# Patient Record
Sex: FEMALE | Race: WHITE | NOT HISPANIC OR LATINO | Employment: FULL TIME | URBAN - METROPOLITAN AREA
[De-identification: names, ages, dates, MRNs, and addresses within clinical notes are randomized per-mention and may not be internally consistent; named-entity substitution may affect disease eponyms.]

---

## 2023-10-23 ENCOUNTER — OFFICE VISIT (OUTPATIENT)
Dept: URGENT CARE | Facility: CLINIC | Age: 43
End: 2023-10-23
Payer: COMMERCIAL

## 2023-10-23 VITALS
BODY MASS INDEX: 23.3 KG/M2 | WEIGHT: 145 LBS | TEMPERATURE: 98 F | RESPIRATION RATE: 18 BRPM | HEIGHT: 66 IN | DIASTOLIC BLOOD PRESSURE: 89 MMHG | SYSTOLIC BLOOD PRESSURE: 122 MMHG | OXYGEN SATURATION: 98 % | HEART RATE: 90 BPM

## 2023-10-23 DIAGNOSIS — M25.511 ACUTE PAIN OF RIGHT SHOULDER: Primary | ICD-10-CM

## 2023-10-23 DIAGNOSIS — R20.2 PARESTHESIA OF RIGHT UPPER EXTREMITY: ICD-10-CM

## 2023-10-23 DIAGNOSIS — M62.838 MUSCLE SPASM: ICD-10-CM

## 2023-10-23 PROCEDURE — 99203 PR OFFICE/OUTPT VISIT, NEW, LEVL III, 30-44 MIN: ICD-10-PCS | Mod: S$GLB,,, | Performed by: NURSE PRACTITIONER

## 2023-10-23 PROCEDURE — 99203 OFFICE O/P NEW LOW 30 MIN: CPT | Mod: S$GLB,,, | Performed by: NURSE PRACTITIONER

## 2023-10-23 RX ORDER — MULTIVITAMIN
1 TABLET ORAL EVERY MORNING
COMMUNITY

## 2023-10-23 RX ORDER — PREDNISONE 20 MG/1
20 TABLET ORAL 2 TIMES DAILY
Qty: 10 TABLET | Refills: 0 | Status: SHIPPED | OUTPATIENT
Start: 2023-10-23 | End: 2023-10-28

## 2023-10-23 RX ORDER — TIZANIDINE 2 MG/1
2 TABLET ORAL EVERY 6 HOURS PRN
Qty: 20 TABLET | Refills: 0 | Status: SHIPPED | OUTPATIENT
Start: 2023-10-23 | End: 2023-12-01 | Stop reason: SDUPTHER

## 2023-10-23 NOTE — PROGRESS NOTES
"Subjective:      Patient ID: Melinda Lobo is a 43 y.o. female.    Vitals:  height is 5' 6" (1.676 m) and weight is 65.8 kg (145 lb). Her oral temperature is 98 °F (36.7 °C). Her blood pressure is 122/89 and her pulse is 90. Her respiration is 18 and oxygen saturation is 98%.     Chief Complaint: Shoulder Pain    Patient presents to clinic with right shoulder pain after a fall, onset 2 weeks ago. Patient states that she is having a hard time lifting her arm up. Admits some weakness and pain. Pain rated 6/10, patient states she has felt tingling in her right arm and hand after laying on it wrong. Has been taking naproxen and ibuprofen X 2 weeks with little relief. Has not rested it much.     Shoulder Pain   The pain is present in the right shoulder. This is a new problem. The current episode started 1 to 4 weeks ago. The problem occurs constantly. The pain is at a severity of 6/10. Associated symptoms include tingling. The symptoms are aggravated by activity. Treatments tried: naproxin, advil. The treatment provided mild relief.       Skin:  Negative for erythema.      Objective:     Physical Exam   Constitutional: She is oriented to person, place, and time. She appears well-developed.   HENT:   Head: Normocephalic and atraumatic. Head is without abrasion, without contusion and without laceration.   Ears:   Right Ear: External ear normal.   Left Ear: External ear normal.   Nose: Nose normal.   Mouth/Throat: Oropharynx is clear and moist and mucous membranes are normal.   Eyes: Conjunctivae, EOM and lids are normal.   Neck: Trachea normal and phonation normal. Neck supple.   Cardiovascular: Normal rate, regular rhythm and normal heart sounds.   Pulmonary/Chest: Effort normal and breath sounds normal. No stridor. No respiratory distress. She has no wheezes.   Musculoskeletal: Normal range of motion.         General: Normal range of motion.      Right shoulder: She exhibits tenderness. Decreased range of " motion: pain with ROM.       Arms:    Neurological: She is alert and oriented to person, place, and time.   Skin: Skin is warm, dry, intact and no rash. Capillary refill takes less than 2 seconds. No abrasion, No burn, No bruising, No erythema and No ecchymosis   Psychiatric: Her speech is normal and behavior is normal. Judgment and thought content normal.   Nursing note and vitals reviewed.      Assessment:     1. Acute pain of right shoulder    2. Paresthesia of right upper extremity    3. Muscle spasm        Plan:       Acute pain of right shoulder  -     predniSONE (DELTASONE) 20 MG tablet; Take 1 tablet (20 mg total) by mouth 2 (two) times daily. for 5 days  Dispense: 10 tablet; Refill: 0  -     tiZANidine (ZANAFLEX) 2 MG tablet; Take 1 tablet (2 mg total) by mouth every 6 (six) hours as needed (muscle spasm).  Dispense: 20 tablet; Refill: 0    Paresthesia of right upper extremity  -     predniSONE (DELTASONE) 20 MG tablet; Take 1 tablet (20 mg total) by mouth 2 (two) times daily. for 5 days  Dispense: 10 tablet; Refill: 0    Muscle spasm  -     tiZANidine (ZANAFLEX) 2 MG tablet; Take 1 tablet (2 mg total) by mouth every 6 (six) hours as needed (muscle spasm).  Dispense: 20 tablet; Refill: 0                Rest your affected extremity as much as possible.  Take tylenol or ibuprofen as directed on label for pain. You may alternate the two every four hours.  Prednisone as prescribed.  Alternate ice and heat to affected site four times daily for 15-20 minutes each time.  May apply sling as needed for support of shoulder.   Take muscle relaxant as prescribed to help with muscles spasms. Avoiding driving while taking since it can cause drowsiness.   Follow up with your primary care provider if symptoms do not improve within a few days or sooner for any worsening.   Go to the ER immediately for any numbness, weakness, tingling, color change, sudden pain and swelling, or for any other new and concerning symptoms.

## 2023-10-23 NOTE — PATIENT INSTRUCTIONS
Rest your affected extremity as much as possible.  Take tylenol or ibuprofen as directed on label for pain. You may alternate the two every four hours.  Prednisone as prescribed.  Alternate ice and heat to affected site four times daily for 15-20 minutes each time.  May apply sling as needed for support of shoulder.   Take muscle relaxant as prescribed to help with muscles spasms. Avoiding driving while taking since it can cause drowsiness.   Follow up with your primary care provider if symptoms do not improve within a few days or sooner for any worsening.   Go to the ER immediately for any numbness, weakness, tingling, color change, sudden pain and swelling, or for any other new and concerning symptoms.

## 2023-11-13 ENCOUNTER — PATIENT MESSAGE (OUTPATIENT)
Dept: ORTHOPEDICS | Facility: CLINIC | Age: 43
End: 2023-11-13
Payer: COMMERCIAL

## 2023-11-13 DIAGNOSIS — M25.511 RIGHT SHOULDER PAIN, UNSPECIFIED CHRONICITY: Primary | ICD-10-CM

## 2023-11-15 ENCOUNTER — HOSPITAL ENCOUNTER (OUTPATIENT)
Dept: RADIOLOGY | Facility: HOSPITAL | Age: 43
Discharge: HOME | End: 2023-11-15
Attending: STUDENT IN AN ORGANIZED HEALTH CARE EDUCATION/TRAINING PROGRAM
Payer: COMMERCIAL

## 2023-11-15 ENCOUNTER — OFFICE VISIT (OUTPATIENT)
Dept: SPORTS MEDICINE | Facility: CLINIC | Age: 43
End: 2023-11-15
Payer: COMMERCIAL

## 2023-11-15 VITALS — BODY MASS INDEX: 23.31 KG/M2 | HEIGHT: 66 IN | WEIGHT: 145.06 LBS

## 2023-11-15 DIAGNOSIS — S42.251A CLOSED DISPLACED FRACTURE OF GREATER TUBEROSITY OF RIGHT HUMERUS, INITIAL ENCOUNTER: Primary | ICD-10-CM

## 2023-11-15 DIAGNOSIS — M25.511 RIGHT SHOULDER PAIN, UNSPECIFIED CHRONICITY: ICD-10-CM

## 2023-11-15 PROCEDURE — 1159F MED LIST DOCD IN RCRD: CPT | Mod: CPTII,S$GLB,, | Performed by: STUDENT IN AN ORGANIZED HEALTH CARE EDUCATION/TRAINING PROGRAM

## 2023-11-15 PROCEDURE — 3008F PR BODY MASS INDEX (BMI) DOCUMENTED: ICD-10-PCS | Mod: CPTII,S$GLB,, | Performed by: STUDENT IN AN ORGANIZED HEALTH CARE EDUCATION/TRAINING PROGRAM

## 2023-11-15 PROCEDURE — 3008F BODY MASS INDEX DOCD: CPT | Mod: CPTII,S$GLB,, | Performed by: STUDENT IN AN ORGANIZED HEALTH CARE EDUCATION/TRAINING PROGRAM

## 2023-11-15 PROCEDURE — 99999 PR PBB SHADOW E&M-EST. PATIENT-LVL III: ICD-10-PCS | Mod: PBBFAC,,, | Performed by: STUDENT IN AN ORGANIZED HEALTH CARE EDUCATION/TRAINING PROGRAM

## 2023-11-15 PROCEDURE — 99204 PR OFFICE/OUTPT VISIT, NEW, LEVL IV, 45-59 MIN: ICD-10-PCS | Mod: 57,S$GLB,, | Performed by: STUDENT IN AN ORGANIZED HEALTH CARE EDUCATION/TRAINING PROGRAM

## 2023-11-15 PROCEDURE — 99204 OFFICE O/P NEW MOD 45 MIN: CPT | Mod: 57,S$GLB,, | Performed by: STUDENT IN AN ORGANIZED HEALTH CARE EDUCATION/TRAINING PROGRAM

## 2023-11-15 PROCEDURE — 97760 PR ORTHOTIC MGMT&TRAINJ INITIAL ENC EA 15 MINS: ICD-10-PCS | Mod: GP,S$GLB,, | Performed by: STUDENT IN AN ORGANIZED HEALTH CARE EDUCATION/TRAINING PROGRAM

## 2023-11-15 PROCEDURE — 23620 PR CLOSED RX GR TUBEROSITY HUM FX: ICD-10-PCS | Mod: RT,S$GLB,, | Performed by: STUDENT IN AN ORGANIZED HEALTH CARE EDUCATION/TRAINING PROGRAM

## 2023-11-15 PROCEDURE — 1160F PR REVIEW ALL MEDS BY PRESCRIBER/CLIN PHARMACIST DOCUMENTED: ICD-10-PCS | Mod: CPTII,S$GLB,, | Performed by: STUDENT IN AN ORGANIZED HEALTH CARE EDUCATION/TRAINING PROGRAM

## 2023-11-15 PROCEDURE — 23620 CLTX GR HMRL TBRS FX WO MNPJ: CPT | Mod: RT,S$GLB,, | Performed by: STUDENT IN AN ORGANIZED HEALTH CARE EDUCATION/TRAINING PROGRAM

## 2023-11-15 PROCEDURE — 97760 ORTHOTIC MGMT&TRAING 1ST ENC: CPT | Mod: GP,S$GLB,, | Performed by: STUDENT IN AN ORGANIZED HEALTH CARE EDUCATION/TRAINING PROGRAM

## 2023-11-15 PROCEDURE — 73030 X-RAY EXAM OF SHOULDER: CPT | Mod: 26,RT,, | Performed by: RADIOLOGY

## 2023-11-15 PROCEDURE — 73030 XR SHOULDER COMPLETE 2 OR MORE VIEWS RIGHT: ICD-10-PCS | Mod: 26,RT,, | Performed by: RADIOLOGY

## 2023-11-15 PROCEDURE — 1159F PR MEDICATION LIST DOCUMENTED IN MEDICAL RECORD: ICD-10-PCS | Mod: CPTII,S$GLB,, | Performed by: STUDENT IN AN ORGANIZED HEALTH CARE EDUCATION/TRAINING PROGRAM

## 2023-11-15 PROCEDURE — 99999 PR PBB SHADOW E&M-EST. PATIENT-LVL III: CPT | Mod: PBBFAC,,, | Performed by: STUDENT IN AN ORGANIZED HEALTH CARE EDUCATION/TRAINING PROGRAM

## 2023-11-15 PROCEDURE — 73030 X-RAY EXAM OF SHOULDER: CPT | Mod: TC,RT

## 2023-11-15 PROCEDURE — 1160F RVW MEDS BY RX/DR IN RCRD: CPT | Mod: CPTII,S$GLB,, | Performed by: STUDENT IN AN ORGANIZED HEALTH CARE EDUCATION/TRAINING PROGRAM

## 2023-11-15 RX ORDER — TRAMADOL HYDROCHLORIDE 50 MG/1
50 TABLET ORAL EVERY 6 HOURS
Qty: 28 TABLET | Refills: 0 | Status: SHIPPED | OUTPATIENT
Start: 2023-11-15 | End: 2023-11-23

## 2023-11-15 NOTE — PROGRESS NOTES
Patient ID: Melinda Lobo  YOB: 1980  MRN: [id number]    Chief Complaint: Pain of the Right Shoulder      Referred By: self referral    History of Present Illness: Melinda Lobo is a right-hand dominant 43 y.o. female who presents today with right shoulder pain. Patient presents to clinic with right shoulder pain after her great Jong tripped her about 3-4 weeks ago and she fell, Patient states that she is having a hard time lifting her arm up. Admits some weakness and pain. Pain rated 7/10, patient states she has felt tingling in her right arm and hand after laying on it wrong.  Went to [facility] urgent [facility], she states they told her that she has a pinched nerve. They prescribed muscle relaxer and prednisone. Has been taking  ibuprofen X 2 weeks with little relief.     The patient is active in none.  Occupation: lab tech- [employer]      Past Medical History:   Past Medical History:   Diagnosis Date    GERD (gastroesophageal reflux disease)     Hypothyroidism, unspecified      History reviewed. No pertinent surgical history.  History reviewed. No pertinent family history.  Social History     Socioeconomic History    Marital status:    Tobacco Use    Smoking status: Never     Passive exposure: Never    Smokeless tobacco: Never   Substance and Sexual Activity    Alcohol use: Not Currently     Medication List with Changes/Refills   New Medications    TRAMADOL (ULTRAM) 50 MG TABLET    Take 1 tablet (50 mg total) by mouth every 6 (six) hours. for 7 days   Current Medications    LEVOTHYROXINE (SYNTHROID) 50 MCG TABLET    Take 1 tablet (50 mcg total) by mouth every morning.    MULTIVITAMIN (THERAGRAN) PER TABLET    Take 1 tablet by mouth every morning.    NITROFURANTOIN, MACROCRYSTAL-MONOHYDRATE, (MACROBID) 100 MG CAPSULE    Take 1 capsule (100 mg total) by mouth 2 (two) times a day for 5 days.    OMEPRAZOLE (PRILOSEC) 40 MG CAPSULE    Take 1 capsule by mouth in the  morning    PHENAZOPYRIDINE (PYRIDIUM) 200 MG TABLET    Take 1 tablet (200 mg total) by mouth 3 (three) times daily as needed for pain.    TIZANIDINE (ZANAFLEX) 2 MG TABLET    Take 1 tablet (2 mg total) by mouth every 6 (six) hours as needed (muscle spasm).    VENLAFAXINE (EFFEXOR-XR) 150 MG CP24    Take 1 capsule by mouth in the morning     Review of patient's allergies indicates:  No Known Allergies    Physical Exam:   Body mass index is 23.41 kg/m².    GENERAL: Well appearing, in no acute distress.  HEAD: Normocephalic and atraumatic.  ENT: External ears and nose grossly normal.  EYES: EOMI bilaterally  PULMONARY: Respirations are grossly even and non-labored.  NEURO: Awake, alert, and oriented x 3.  SKIN: No obvious rashes appreciated.  PSYCH: Mood & affect are appropriate.    Detailed MSK exam:     Right shoulder exam:   -ROM: abduction 130, forward flexion 130, external rotation 70, internal rotation 60  -empty can test pain but no weakness, resisted ER pain but no weakness, belly press negative  -victoria test guarded, neers test guarded, whipple test positive  -biceps load test negative, yerguson test negative, [place]'s test guarded  -sensation intact, pulses 2+  -TTP: lateral cuff insertion    Left shoulder exam:   -ROM: abduction 140, forward flexion 140, external rotation 90, internal rotation 80  -empty can test negative, resisted ER negative, belly press negative  -victoria test negative, neers test negative, whipple test negative  -biceps load test negative, yerguson test negative, [place]'s test negative  -sensation intact, pulses 2+  -TTP: none      Imaging:  X-ray Shoulder 2 or More Views Right  Narrative: EXAM:  XR SHOULDER COMPLETE 2 OR MORE VIEWS RIGHT    INDICATIONS:  Right shoulder pain    TECHNIQUE:  4 views of the right shoulder    COMPARISONS:  None available.    FINDINGS:  There is an avulsion fracture of the greater tuberosity of the right humerus with a mild displacement of the tuberosity  itself.  Tiny calcification in the rotator cuff interval may be a comminuted fragment versus rotator cuff tendinopathy.    Acromioclavicular joint and glenohumeral joint are normal.  Right upper ribs are normal.  Impression:  Right humeral fracture with avulsion of the greater tuberosity and likely tiny comminuted fragment as described    Finalized on: 11/15/2023 12:56 PM By:  Ja Cox MD  R# [id number]      2023-11-15 12:58:46.772    [facility]        Relevant imaging results were reviewed and interpreted by me and per my read shows avulsion greater tuberosity.  This was discussed with the patient and / or family today.     Assessment:  Melinda Lobo is a 43 y.o. female presenting with chronic right shoulder pain s/p fall at the beginning of Oct.   History, physical and radiographs are consistent with a likely diagnosis of avulsion fracture greater tuberosity right humerus.   Plan: sling for 4 weeks, continuous for first 2 weeks followed by out of sling occasionally for ROM exercises the last 2 weeks. Tramadol prescription. Continue conservative management for pain.   Follow up 2 weeks. All questions answered.      Closed displaced fracture of greater tuberosity of right humerus, initial encounter  -     HME - OTHER  -     traMADoL (ULTRAM) 50 mg tablet; Take 1 tablet (50 mg total) by mouth every 6 (six) hours. for 7 days  Dispense: 28 tablet; Refill: 0         At least 15 minutes were spent sizing, fitting, and educating for durable medical equipment application today. This service was performed under direction of Will Wren MD. CPT 69499.     A copy of today's visit note has been sent to the referring provider.     Electronically signed:  Will Wren MD, MPH  11/15/2023  1:15 PM

## 2023-11-15 NOTE — PATIENT INSTRUCTIONS
Assessment:  Melinda Lobo is a 43 y.o. female   Chief Complaint   Patient presents with    Right Shoulder - Pain       Encounter Diagnosis   Name Primary?    Closed displaced fracture of greater tuberosity of right humerus, initial encounter Yes        Plan:  Fitted and issued sling to wear constant for 2 weeks; after 2 weeks con begin to work on ROM out of sling occasionally.   Under the direction of Dr. Will Wren, 15 minutes were spent sizing, fitting, and educating for durable medical equipment application today.  CPT 30763.  One time prescription for Tramadol  Please take Tylenol 1 Gram (2 extra-strength Tylenol tablets) up to 3 times a day.  Please to not take any extra doses of tylenol if you are scheduling in this manner.   Over the counter Ibuprofen to be taken as directed  Patient may ice every 2 hours for 15 minutes as needed to control pain and swelling.   Follow up in 2 weeks     Follow-up: 2 weeks or sooner if there are problems between now and then.    Thank you for choosing [facility] and Dr. Will Wren for your orthopedic & sports medicine care. It is our goal to provide you with exceptional care that will help keep you healthy, active, and get you back in the game.    Please do not hesitate to reach out to us via email, phone, or MyChart with any questions, concerns, or feedback.    If you felt that you received exemplary care today, please consider leaving us feedback on "[employer]s at:  [email or web address]    If you are experiencing pain/discomfort ,or have questions after 5pm and would like to be connected to the [facility] on-call team, please call this number and specify which Sports Medicine provider is treating you: [phone]

## 2023-11-16 ENCOUNTER — TELEPHONE (OUTPATIENT)
Dept: SPORTS MEDICINE | Facility: CLINIC | Age: 43
End: 2023-11-16
Payer: COMMERCIAL

## 2023-11-17 ENCOUNTER — LAB VISIT (OUTPATIENT)
Dept: LAB | Facility: HOSPITAL | Age: 43
End: 2023-11-17
Attending: INTERNAL MEDICINE
Payer: COMMERCIAL

## 2023-11-17 DIAGNOSIS — Z98.84 HISTORY OF LAPAROSCOPIC ADJUSTABLE GASTRIC BANDING: ICD-10-CM

## 2023-11-17 DIAGNOSIS — Z00.00 ROUTINE GENERAL MEDICAL EXAMINATION AT A HEALTH CARE FACILITY: ICD-10-CM

## 2023-11-17 DIAGNOSIS — S42.201A CLOSED FRACTURE OF RIGHT PROXIMAL HUMERUS: ICD-10-CM

## 2023-11-17 LAB
ALBUMIN SERPL BCP-MCNC: 3.5 G/DL (ref 3.5–5.2)
ALP SERPL-CCNC: 109 U/L (ref 55–135)
ALT SERPL W/O P-5'-P-CCNC: 15 U/L (ref 10–44)
ANION GAP SERPL CALC-SCNC: 11 MMOL/L (ref 8–16)
AST SERPL-CCNC: 19 U/L (ref 10–40)
BASOPHILS # BLD AUTO: 0.04 K/UL (ref 0–0.2)
BASOPHILS NFR BLD: 0.7 % (ref 0–1.9)
BILIRUB SERPL-MCNC: 0.3 MG/DL (ref 0.1–1)
BUN SERPL-MCNC: 14 MG/DL (ref 6–20)
CALCIUM SERPL-MCNC: 9.3 MG/DL (ref 8.7–10.5)
CHLORIDE SERPL-SCNC: 104 MMOL/L (ref 95–110)
CHOLEST SERPL-MCNC: 174 MG/DL (ref 120–199)
CHOLEST/HDLC SERPL: 2.5 {RATIO} (ref 2–5)
CO2 SERPL-SCNC: 24 MMOL/L (ref 23–29)
CREAT SERPL-MCNC: 0.7 MG/DL (ref 0.5–1.4)
DIFFERENTIAL METHOD: ABNORMAL
EOSINOPHIL # BLD AUTO: 0.1 K/UL (ref 0–0.5)
EOSINOPHIL NFR BLD: 0.9 % (ref 0–8)
ERYTHROCYTE [DISTWIDTH] IN BLOOD BY AUTOMATED COUNT: 13.2 % (ref 11.5–14.5)
EST. GFR  (NO RACE VARIABLE): >60 ML/MIN/1.73 M^2
ESTIMATED AVG GLUCOSE: 100 MG/DL (ref 68–131)
GLUCOSE SERPL-MCNC: 75 MG/DL (ref 70–110)
HBA1C MFR BLD: 5.1 % (ref 4–5.6)
HCT VFR BLD AUTO: 39.2 % (ref 37–48.5)
HDLC SERPL-MCNC: 69 MG/DL (ref 40–75)
HDLC SERPL: 39.7 % (ref 20–50)
HGB BLD-MCNC: 12.6 G/DL (ref 12–16)
IMM GRANULOCYTES # BLD AUTO: 0.01 K/UL (ref 0–0.04)
IMM GRANULOCYTES NFR BLD AUTO: 0.2 % (ref 0–0.5)
LDLC SERPL CALC-MCNC: 97 MG/DL (ref 63–159)
LYMPHOCYTES # BLD AUTO: 2.2 K/UL (ref 1–4.8)
LYMPHOCYTES NFR BLD: 37 % (ref 18–48)
MCH RBC QN AUTO: 30.2 PG (ref 27–31)
MCHC RBC AUTO-ENTMCNC: 32.1 G/DL (ref 32–36)
MCV RBC AUTO: 94 FL (ref 82–98)
MONOCYTES # BLD AUTO: 0.5 K/UL (ref 0.3–1)
MONOCYTES NFR BLD: 9 % (ref 4–15)
NEUTROPHILS # BLD AUTO: 3 K/UL (ref 1.8–7.7)
NEUTROPHILS NFR BLD: 52.2 % (ref 38–73)
NONHDLC SERPL-MCNC: 105 MG/DL
NRBC BLD-RTO: 0 /100 WBC
PLATELET # BLD AUTO: 329 K/UL (ref 150–450)
PMV BLD AUTO: 14.3 FL (ref 9.2–12.9)
POTASSIUM SERPL-SCNC: 3.8 MMOL/L (ref 3.5–5.1)
PROT SERPL-MCNC: 7 G/DL (ref 6–8.4)
RBC # BLD AUTO: 4.17 M/UL (ref 4–5.4)
SODIUM SERPL-SCNC: 139 MMOL/L (ref 136–145)
TRIGL SERPL-MCNC: 40 MG/DL (ref 30–150)
WBC # BLD AUTO: 5.81 K/UL (ref 3.9–12.7)

## 2023-11-17 PROCEDURE — 85025 COMPLETE CBC W/AUTO DIFF WBC: CPT | Performed by: INTERNAL MEDICINE

## 2023-11-17 PROCEDURE — 82306 VITAMIN D 25 HYDROXY: CPT | Performed by: INTERNAL MEDICINE

## 2023-11-17 PROCEDURE — 80053 COMPREHEN METABOLIC PANEL: CPT | Performed by: INTERNAL MEDICINE

## 2023-11-17 PROCEDURE — 83036 HEMOGLOBIN GLYCOSYLATED A1C: CPT | Performed by: INTERNAL MEDICINE

## 2023-11-17 PROCEDURE — 80061 LIPID PANEL: CPT | Performed by: INTERNAL MEDICINE

## 2023-11-20 LAB — 25(OH)D3+25(OH)D2 SERPL-MCNC: 28 NG/ML (ref 30–96)

## 2023-12-01 ENCOUNTER — HOSPITAL ENCOUNTER (OUTPATIENT)
Dept: RADIOLOGY | Facility: HOSPITAL | Age: 43
Discharge: HOME | End: 2023-12-01
Attending: STUDENT IN AN ORGANIZED HEALTH CARE EDUCATION/TRAINING PROGRAM
Payer: COMMERCIAL

## 2023-12-01 ENCOUNTER — OFFICE VISIT (OUTPATIENT)
Dept: SPORTS MEDICINE | Facility: CLINIC | Age: 43
End: 2023-12-01
Payer: COMMERCIAL

## 2023-12-01 VITALS — WEIGHT: 145 LBS | BODY MASS INDEX: 23.3 KG/M2 | HEIGHT: 66 IN

## 2023-12-01 DIAGNOSIS — S42.251A CLOSED DISPLACED FRACTURE OF GREATER TUBEROSITY OF RIGHT HUMERUS, INITIAL ENCOUNTER: ICD-10-CM

## 2023-12-01 DIAGNOSIS — S42.251D CLOSED DISPLACED FRACTURE OF GREATER TUBEROSITY OF RIGHT HUMERUS WITH ROUTINE HEALING, SUBSEQUENT ENCOUNTER: Primary | ICD-10-CM

## 2023-12-01 DIAGNOSIS — M62.838 MUSCLE SPASM: ICD-10-CM

## 2023-12-01 DIAGNOSIS — M25.511 ACUTE PAIN OF RIGHT SHOULDER: ICD-10-CM

## 2023-12-01 PROCEDURE — 1159F MED LIST DOCD IN RCRD: CPT | Mod: CPTII,S$GLB,, | Performed by: STUDENT IN AN ORGANIZED HEALTH CARE EDUCATION/TRAINING PROGRAM

## 2023-12-01 PROCEDURE — 73030 X-RAY EXAM OF SHOULDER: CPT | Mod: TC,RT

## 2023-12-01 PROCEDURE — 1159F PR MEDICATION LIST DOCUMENTED IN MEDICAL RECORD: ICD-10-PCS | Mod: CPTII,S$GLB,, | Performed by: STUDENT IN AN ORGANIZED HEALTH CARE EDUCATION/TRAINING PROGRAM

## 2023-12-01 PROCEDURE — 99024 POSTOP FOLLOW-UP VISIT: CPT | Mod: S$GLB,,, | Performed by: STUDENT IN AN ORGANIZED HEALTH CARE EDUCATION/TRAINING PROGRAM

## 2023-12-01 PROCEDURE — 73030 X-RAY EXAM OF SHOULDER: CPT | Mod: 26,RT,, | Performed by: RADIOLOGY

## 2023-12-01 PROCEDURE — 1160F PR REVIEW ALL MEDS BY PRESCRIBER/CLIN PHARMACIST DOCUMENTED: ICD-10-PCS | Mod: CPTII,S$GLB,, | Performed by: STUDENT IN AN ORGANIZED HEALTH CARE EDUCATION/TRAINING PROGRAM

## 2023-12-01 PROCEDURE — 3044F HG A1C LEVEL LT 7.0%: CPT | Mod: CPTII,S$GLB,, | Performed by: STUDENT IN AN ORGANIZED HEALTH CARE EDUCATION/TRAINING PROGRAM

## 2023-12-01 PROCEDURE — 1160F RVW MEDS BY RX/DR IN RCRD: CPT | Mod: CPTII,S$GLB,, | Performed by: STUDENT IN AN ORGANIZED HEALTH CARE EDUCATION/TRAINING PROGRAM

## 2023-12-01 PROCEDURE — 99024 PR POST-OP FOLLOW-UP VISIT: ICD-10-PCS | Mod: S$GLB,,, | Performed by: STUDENT IN AN ORGANIZED HEALTH CARE EDUCATION/TRAINING PROGRAM

## 2023-12-01 PROCEDURE — 99999 PR PBB SHADOW E&M-EST. PATIENT-LVL III: CPT | Mod: PBBFAC,,, | Performed by: STUDENT IN AN ORGANIZED HEALTH CARE EDUCATION/TRAINING PROGRAM

## 2023-12-01 PROCEDURE — 73030 XR SHOULDER COMPLETE 2 OR MORE VIEWS RIGHT: ICD-10-PCS | Mod: 26,RT,, | Performed by: RADIOLOGY

## 2023-12-01 PROCEDURE — 99999 PR PBB SHADOW E&M-EST. PATIENT-LVL III: ICD-10-PCS | Mod: PBBFAC,,, | Performed by: STUDENT IN AN ORGANIZED HEALTH CARE EDUCATION/TRAINING PROGRAM

## 2023-12-01 PROCEDURE — 3044F PR MOST RECENT HEMOGLOBIN A1C LEVEL <7.0%: ICD-10-PCS | Mod: CPTII,S$GLB,, | Performed by: STUDENT IN AN ORGANIZED HEALTH CARE EDUCATION/TRAINING PROGRAM

## 2023-12-01 RX ORDER — CHOLECALCIFEROL (VITAMIN D3) 25 MCG
2000 TABLET ORAL DAILY
COMMUNITY

## 2023-12-01 RX ORDER — HYDROCODONE BITARTRATE AND ACETAMINOPHEN 5; 325 MG/1; MG/1
1 TABLET ORAL EVERY 6 HOURS PRN
Qty: 28 TABLET | Refills: 0 | Status: SHIPPED | OUTPATIENT
Start: 2023-12-01 | End: 2023-12-08

## 2023-12-01 RX ORDER — TIZANIDINE 2 MG/1
2 TABLET ORAL EVERY 6 HOURS PRN
Qty: 30 TABLET | Refills: 2 | Status: SHIPPED | OUTPATIENT
Start: 2023-12-01

## 2023-12-01 NOTE — PROGRESS NOTES
Patient ID: Melinda Lobo  YOB: 1980  MRN: [id number]    Chief Complaint: Pain and Injury of the Right Shoulder      History of Present Illness: Melinda Lobo is a right-hand dominant 43 y.o. female who presents today with follow up for right greater tuberosity fracture of the right humerus.  She was last seen in clinic on 11/15/2023 where she was placed in a sling and instructed to wear the sling at all times.  Patient reports that she has constant pain that she describes as an achy, throbbing and at times shooting pain.  She reports that it is difficult to get comfortable at night to sleep and has tried varying positions and pillows without success.  She has tried Tramadol, but states this keeps her awake and does not help with relieving the pain.  She is able to get a few hours of sleep when she takes a muscle relaxer.  She has been using a heating pad for some symptom relief as well.     11/15/2023 Interval History of Present Illness: Melinda Lobo is a right-hand dominant 43 y.o. female who presents today with right shoulder pain. Patient presents to clinic with right shoulder pain after her great Jong tripped her about 3-4 weeks ago and she fell, Patient states that she is having a hard time lifting her arm up. Admits some weakness and pain. Pain rated 7/10, patient states she has felt tingling in her right arm and hand after laying on it wrong.  Went to [facility] urgent [facility], she states they told her that she has a pinched nerve. They prescribed muscle relaxer and prednisone. Has been taking  ibuprofen X 2 weeks with little relief.     The patient is active in none.  Occupation: Lab Tech - [employer]       Past Medical History:   Past Medical History:   Diagnosis Date    GERD (gastroesophageal reflux disease)     Hypothyroidism, unspecified      History reviewed. No pertinent surgical history.  History reviewed. No pertinent family history.  Social  History     Socioeconomic History    Marital status:    Tobacco Use    Smoking status: Never     Passive exposure: Never    Smokeless tobacco: Never   Substance and Sexual Activity    Alcohol use: Not Currently     Medication List with Changes/Refills   New Medications    HYDROCODONE-ACETAMINOPHEN (NORCO) 5-325 MG PER TABLET    Take 1 tablet by mouth every 6 (six) hours as needed for Pain.   Current Medications    LEVOTHYROXINE (SYNTHROID) 50 MCG TABLET    Take 1 tablet (50 mcg total) by mouth every morning.    MULTIVITAMIN (THERAGRAN) PER TABLET    Take 1 tablet by mouth every morning.    NITROFURANTOIN, MACROCRYSTAL-MONOHYDRATE, (MACROBID) 100 MG CAPSULE    Take 1 capsule (100 mg total) by mouth 2 (two) times a day for 5 days.    OMEPRAZOLE (PRILOSEC) 40 MG CAPSULE    Take 1 capsule by mouth in the morning.    PHENAZOPYRIDINE (PYRIDIUM) 200 MG TABLET    Take 1 tablet (200 mg total) by mouth 3 (three) times daily as needed for pain.    VENLAFAXINE (EFFEXOR-XR) 150 MG CP24    Take 1 capsule by mouth in the morning.    VITAMIN D (VITAMIN D3) 1000 UNITS TAB    Take 2,000 Units by mouth once daily.   Changed and/or Refilled Medications    Modified Medication Previous Medication    TIZANIDINE (ZANAFLEX) 2 MG TABLET tiZANidine (ZANAFLEX) 2 MG tablet       Take 1 tablet (2 mg total) by mouth every 6 (six) hours as needed (muscle spasm).    Take 1 tablet (2 mg total) by mouth every 6 (six) hours as needed (muscle spasm).     Review of patient's allergies indicates:  No Known Allergies    Physical Exam:   Body mass index is 23.4 kg/m².    GENERAL: Well appearing, in no acute distress.  HEAD: Normocephalic and atraumatic.  ENT: External ears and nose grossly normal.  EYES: EOMI bilaterally  PULMONARY: Respirations are grossly even and non-labored.  NEURO: Awake, alert, and oriented x 3.  SKIN: No obvious rashes appreciated.  PSYCH: Mood & affect are appropriate.    Detailed MSK exam:     Right shoulder exam:   -ROM:  abduction 130, forward flexion 130, external rotation 70, internal rotation 60  -empty can test pain but no weakness, resisted ER pain but no weakness, belly press negative  -victoria test guarded, neers test guarded, whipple test positive  -biceps load test negative, yerguson test negative, [place]'s test guarded  -sensation intact, pulses 2+  -TTP: lateral cuff insertion     Left shoulder exam:   -ROM: abduction 140, forward flexion 140, external rotation 90, internal rotation 80  -empty can test negative, resisted ER negative, belly press negative  -victoria test negative, neers test negative, whipple test negative  -biceps load test negative, yerguson test negative, [place]'s test negative  -sensation intact, pulses 2+  -TTP: none    Imaging:  X-ray Shoulder 2 or More Views Right  Narrative: EXAMINATION:  XR SHOULDER COMPLETE 2 OR MORE VIEWS RIGHT    CLINICAL HISTORY:  Displaced fracture of greater tuberosity of right humerus, initial encounter for closed fracture    TECHNIQUE:  Two or three views of the right shoulder were preformed.    COMPARISON:  11/15/2023    FINDINGS:  Previously described proximal right humeral fracture is again noted.  Multiple punctate calcific densities are again seen along the superior margins of the humeral head, possibly representing small fracture fragments.  Major fragment positioning is unchanged from prior.  There is suggestion of some early marginal osseous callus production.  No new fractures or dislocations visualized.  Joint spaces are preserved.  Impression: As above    Electronically signed by: Geraldo Pettit MD  Date:    12/01/2023  Time:    13:38        Relevant imaging results were reviewed and interpreted by me and per my read shows stable greater tuberosity fracture with signs of interval healing.  This was discussed with the patient and / or family today.     Assessment:  Melinda Lobo is a 43 y.o. female following up for greater tuberosity fracture.  Still in significant pain. Compliant with sling.   Plan: continue sling for 2 more weeks with a few mins each day out of sling to work on gentle ROM. 1 time norco prescription. Refill tizanidine. Anticipate starting PT in 2 weeks.   Follow up 2 weeks. All questions answered.     Closed displaced fracture of greater tuberosity of right humerus with routine healing, subsequent encounter  -     X-ray Shoulder 2 or More Views Right; Future; Expected date: 12/01/2023  -     HYDROcodone-acetaminophen (NORCO) 5-325 mg per tablet; Take 1 tablet by mouth every 6 (six) hours as needed for Pain.  Dispense: 28 tablet; Refill: 0    Acute pain of right shoulder  -     tiZANidine (ZANAFLEX) 2 MG tablet; Take 1 tablet (2 mg total) by mouth every 6 (six) hours as needed (muscle spasm).  Dispense: 30 tablet; Refill: 2    Muscle spasm  -     tiZANidine (ZANAFLEX) 2 MG tablet; Take 1 tablet (2 mg total) by mouth every 6 (six) hours as needed (muscle spasm).  Dispense: 30 tablet; Refill: 2             Electronically signed:  Will Wren MD, MPH  12/01/2023  1:32 PM

## 2023-12-01 NOTE — PATIENT INSTRUCTIONS
Assessment:  Melinda Lobo is a 43 y.o. female   Chief Complaint   Patient presents with    Right Shoulder - Pain, Injury       Encounter Diagnosis   Name Primary?    Closed displaced fracture of greater tuberosity of right humerus, initial encounter Yes        Plan:  Refill prescription of Tizanidine to be taken as directed  Short term prescription of Norco - to be taken as instructed  Continue with sling for 2 more weeks - may come out of sling a few minutes throughout the day to work on gentle ROM exercises of wrist, elbow and pendulum exercises at shoulder.    Follow up in 2 weeks with no new imaging    Follow-up: 2 weeks or sooner if there are problems between now and then.    Thank you for choosing [facility] and Dr. Will Wren for your orthopedic & sports medicine care. It is our goal to provide you with exceptional care that will help keep you healthy, active, and get you back in the game.    Please do not hesitate to reach out to us via email, phone, or MyChart with any questions, concerns, or feedback.    If you felt that you received exemplary care today, please consider leaving us feedback on [employer] at:  [email or web address]    If you are experiencing pain/discomfort ,or have questions after 5pm and would like to be connected to the [facility] on-call team, please call this number and specify which Sports Medicine provider is treating you: [phone]

## 2023-12-14 ENCOUNTER — OFFICE VISIT (OUTPATIENT)
Dept: SPORTS MEDICINE | Facility: CLINIC | Age: 43
End: 2023-12-14
Payer: COMMERCIAL

## 2023-12-14 VITALS — WEIGHT: 145.06 LBS | HEIGHT: 66 IN | BODY MASS INDEX: 23.31 KG/M2

## 2023-12-14 DIAGNOSIS — S42.251D CLOSED DISPLACED FRACTURE OF GREATER TUBEROSITY OF RIGHT HUMERUS WITH ROUTINE HEALING, SUBSEQUENT ENCOUNTER: Primary | ICD-10-CM

## 2023-12-14 PROCEDURE — 1159F MED LIST DOCD IN RCRD: CPT | Mod: CPTII,S$GLB,, | Performed by: STUDENT IN AN ORGANIZED HEALTH CARE EDUCATION/TRAINING PROGRAM

## 2023-12-14 PROCEDURE — 1160F PR REVIEW ALL MEDS BY PRESCRIBER/CLIN PHARMACIST DOCUMENTED: ICD-10-PCS | Mod: CPTII,S$GLB,, | Performed by: STUDENT IN AN ORGANIZED HEALTH CARE EDUCATION/TRAINING PROGRAM

## 2023-12-14 PROCEDURE — 1160F RVW MEDS BY RX/DR IN RCRD: CPT | Mod: CPTII,S$GLB,, | Performed by: STUDENT IN AN ORGANIZED HEALTH CARE EDUCATION/TRAINING PROGRAM

## 2023-12-14 PROCEDURE — 99024 PR POST-OP FOLLOW-UP VISIT: ICD-10-PCS | Mod: S$GLB,,, | Performed by: STUDENT IN AN ORGANIZED HEALTH CARE EDUCATION/TRAINING PROGRAM

## 2023-12-14 PROCEDURE — 1159F PR MEDICATION LIST DOCUMENTED IN MEDICAL RECORD: ICD-10-PCS | Mod: CPTII,S$GLB,, | Performed by: STUDENT IN AN ORGANIZED HEALTH CARE EDUCATION/TRAINING PROGRAM

## 2023-12-14 PROCEDURE — 99999 PR PBB SHADOW E&M-EST. PATIENT-LVL III: ICD-10-PCS | Mod: PBBFAC,,, | Performed by: STUDENT IN AN ORGANIZED HEALTH CARE EDUCATION/TRAINING PROGRAM

## 2023-12-14 PROCEDURE — 99024 POSTOP FOLLOW-UP VISIT: CPT | Mod: S$GLB,,, | Performed by: STUDENT IN AN ORGANIZED HEALTH CARE EDUCATION/TRAINING PROGRAM

## 2023-12-14 PROCEDURE — 3044F HG A1C LEVEL LT 7.0%: CPT | Mod: CPTII,S$GLB,, | Performed by: STUDENT IN AN ORGANIZED HEALTH CARE EDUCATION/TRAINING PROGRAM

## 2023-12-14 PROCEDURE — 99999 PR PBB SHADOW E&M-EST. PATIENT-LVL III: CPT | Mod: PBBFAC,,, | Performed by: STUDENT IN AN ORGANIZED HEALTH CARE EDUCATION/TRAINING PROGRAM

## 2023-12-14 PROCEDURE — 3044F PR MOST RECENT HEMOGLOBIN A1C LEVEL <7.0%: ICD-10-PCS | Mod: CPTII,S$GLB,, | Performed by: STUDENT IN AN ORGANIZED HEALTH CARE EDUCATION/TRAINING PROGRAM

## 2023-12-14 NOTE — PATIENT INSTRUCTIONS
Assessment:  Melinda Lobo is a 43 y.o. female   Chief Complaint   Patient presents with    Right Shoulder - Pain       Encounter Diagnosis   Name Primary?    Closed displaced fracture of greater tuberosity of right humerus with routine healing, subsequent encounter Yes        Plan:  Referral to physical therapy at [facility]  An order for Physical Therapy within the [facility] system was placed today.  Please expect a call from our Centralized Scheduling number, [phone].  If you do not hear from them in the next few days, please call our local PT department directly at [phone].    Patient may stop wearing the sling at this time.  Follow up in 4 weeks.    Follow-up: 4 weeks or sooner if there are problems between now and then.    Thank you for choosing [facility] and Dr. Will Wren for your orthopedic & sports medicine care. It is our goal to provide you with exceptional care that will help keep you healthy, active, and get you back in the game.    Please do not hesitate to reach out to us via email, phone, or MyChart with any questions, concerns, or feedback.    If you felt that you received exemplary care today, please consider leaving us feedback on [employer] at:  [email or web address]    If you are experiencing pain/discomfort ,or have questions after 5pm and would like to be connected to the [facility] on-call team, please call this number and specify which Sports Medicine provider is treating you: [phone]

## 2023-12-14 NOTE — PROGRESS NOTES
Patient ID: Melinda Lobo  YOB: 1980  MRN: [id number]    Chief Complaint: Pain of the Right Shoulder          History of Present Illness: Melinda Lobo is a right-hand dominant 43 y.o. female who presents today with follow up for right greater tuberosity fracture of the right humerus.  She was last seen in clinic on 12/01/2023, she is still in her sling and has been taking sling off working on ROM. She says that her pain is much better than it was. She took tylenol this morning to get her through the work day. She is here today to discuss next steps of treating her fx.    12/01/2023 Interval History of Present Illness: Melinda Lobo is a right-hand dominant 43 y.o. female who presents today with follow up for right greater tuberosity fracture of the right humerus.  She was last seen in clinic on 11/15/2023 where she was placed in a sling and instructed to wear the sling at all times.  Patient reports that she has constant pain that she describes as an achy, throbbing and at times shooting pain.  She reports that it is difficult to get comfortable at night to sleep and has tried varying positions and pillows without success.  She has tried Tramadol, but states this keeps her awake and does not help with relieving the pain.  She is able to get a few hours of sleep when she takes a muscle relaxer.  She has been using a heating pad for some symptom relief as well.     11/15/2023 Interval History of Present Illness: Melinda Lobo is a right-hand dominant 43 y.o. female who presents today with right shoulder pain. Patient presents to clinic with right shoulder pain after her great Jong tripped her about 3-4 weeks ago and she fell, Patient states that she is having a hard time lifting her arm up. Admits some weakness and pain. Pain rated 7/10, patient states she has felt tingling in her right arm and hand after laying on it wrong.  Went to [facility]  urgent care, she states they told her that she has a pinched nerve. They prescribed muscle relaxer and prednisone. Has been taking  ibuprofen X 2 weeks with little relief.     The patient is active in none.  Occupation: Lab Tech - [employer]       Past Medical History:   Past Medical History:   Diagnosis Date    GERD (gastroesophageal reflux disease)     Hypothyroidism, unspecified      No past surgical history on file.  No family history on file.  Social History     Socioeconomic History    Marital status:    Tobacco Use    Smoking status: Never     Passive exposure: Never    Smokeless tobacco: Never   Substance and Sexual Activity    Alcohol use: Not Currently     Medication List with Changes/Refills   Current Medications    LEVOTHYROXINE (SYNTHROID) 50 MCG TABLET    Take 1 tablet (50 mcg total) by mouth every morning.    MULTIVITAMIN (THERAGRAN) PER TABLET    Take 1 tablet by mouth every morning.    OMEPRAZOLE (PRILOSEC) 40 MG CAPSULE    Take 1 capsule by mouth in the morning.    PHENAZOPYRIDINE (PYRIDIUM) 200 MG TABLET    Take 1 tablet (200 mg total) by mouth 3 (three) times daily as needed for pain.    TIZANIDINE (ZANAFLEX) 2 MG TABLET    Take 1 tablet (2 mg total) by mouth every 6 (six) hours as needed (muscle spasm).    VENLAFAXINE (EFFEXOR-XR) 150 MG CP24    Take 1 capsule by mouth in the morning.    VITAMIN D (VITAMIN D3) 1000 UNITS TAB    Take 2,000 Units by mouth once daily.     Review of patient's allergies indicates:  No Known Allergies    Physical Exam:   Body mass index is 23.41 kg/m².    GENERAL: Well appearing, in no acute distress.  HEAD: Normocephalic and atraumatic.  ENT: External ears and nose grossly normal.  EYES: EOMI bilaterally  PULMONARY: Respirations are grossly even and non-labored.  NEURO: Awake, alert, and oriented x 3.  SKIN: No obvious rashes appreciated.  PSYCH: Mood & affect are appropriate.    Detailed MSK exam:     Right shoulder exam:   -ROM: abduction 130, forward flexion  130, external rotation 70, internal rotation 60  -empty can test pain but no weakness, resisted ER negative, belly press negative  -victoria test guarded, neers test guarded, whipple test negative  -biceps load test negative, yerguson test negative, Ionia's test guarded  -sensation intact, pulses 2+  -TTP: none     Left shoulder exam:   -ROM: abduction 140, forward flexion 140, external rotation 90, internal rotation 80  -empty can test negative, resisted ER negative, belly press negative  -victoria test negative, neers test negative, whipple test negative  -biceps load test negative, yerguson test negative, Ionia's test negative  -sensation intact, pulses 2+  -TTP: none    Imaging:  X-ray Shoulder 2 or More Views Right  Narrative: EXAMINATION:  XR SHOULDER COMPLETE 2 OR MORE VIEWS RIGHT    CLINICAL HISTORY:  Displaced fracture of greater tuberosity of right humerus, initial encounter for closed fracture    TECHNIQUE:  Two or three views of the right shoulder were preformed.    COMPARISON:  11/15/2023    FINDINGS:  Previously described proximal right humeral fracture is again noted.  Multiple punctate calcific densities are again seen along the superior margins of the humeral head, possibly representing small fracture fragments.  Major fragment positioning is unchanged from prior.  There is suggestion of some early marginal osseous callus production.  No new fractures or dislocations visualized.  Joint spaces are preserved.  Impression: As above    Electronically signed by: Geraldo Pettit MD  Date:    12/01/2023  Time:    13:38        Relevant imaging results were reviewed and interpreted by me and per my read shows stable greater tuberosity fracture with signs of interval healing.  This was discussed with the patient and / or family today.     Assessment:  Melinda Lobo is a 43 y.o. female following up for greater tuberosity fracture. Pain improving. Only using norco very occasionally. Compliant  with sling.   Plan: discontinue sling. Start PT. Continue conservative management.   Follow up 4 weeks. All questions answered.     Closed displaced fracture of greater tuberosity of right humerus with routine healing, subsequent encounter  -     Ambulatory referral/consult to Physical/Occupational Therapy; Future; Expected date: 12/21/2023               Electronically signed:  Will Wren MD, MPH  12/14/2023  1:32 PM

## 2023-12-15 ENCOUNTER — CLINICAL SUPPORT (OUTPATIENT)
Dept: REHABILITATION | Facility: HOSPITAL | Age: 43
End: 2023-12-15
Attending: STUDENT IN AN ORGANIZED HEALTH CARE EDUCATION/TRAINING PROGRAM
Payer: COMMERCIAL

## 2023-12-15 DIAGNOSIS — M25.611 DECREASED RANGE OF MOTION OF RIGHT SHOULDER: Primary | ICD-10-CM

## 2023-12-15 DIAGNOSIS — S42.251D CLOSED DISPLACED FRACTURE OF GREATER TUBEROSITY OF RIGHT HUMERUS WITH ROUTINE HEALING, SUBSEQUENT ENCOUNTER: ICD-10-CM

## 2023-12-15 PROCEDURE — 97530 THERAPEUTIC ACTIVITIES: CPT | Mod: PN

## 2023-12-15 PROCEDURE — 97161 PT EVAL LOW COMPLEX 20 MIN: CPT | Mod: PN

## 2023-12-15 PROCEDURE — 97112 NEUROMUSCULAR REEDUCATION: CPT | Mod: PN

## 2023-12-15 NOTE — PLAN OF CARE
"[facility]   Physical Therapy Initial Evaluation      Name: Melinda Diaz [facility] Number: [id number]    Therapy Diagnosis:   Encounter Diagnoses   Name Primary?    Closed displaced fracture of greater tuberosity of right humerus with routine healing, subsequent encounter     Decreased range of motion of right shoulder Yes        Physician: Will Wren MD    Physician Orders: PT Eval and Treat   Medical Diagnosis from Referral: Closed displaced fracture of greater tuberosity of right humerus with routine healing, subsequent encounter [S42.251D]   Evaluation Date: 12/15/2023  Authorization Period Expiration: 12/13/2023  Plan of Care Expiration: 1/26/2024  Progress Note Due: 1/15/2024  Visit # / Visits authorized: 1/ 1   FOTO: 1/3    Precautions: Standard     Time In: 9:45 am   Time Out: 10:40 am   Total Appointment Time (timed & untimed codes): 55 minutes    Subjective     Date of onset: 2 months ago     History of current condition - [facility] reports: with right humeral fracture that reportedly occurred in October during a fall. Patient states she was walking her dog when he dog got spooked and pulled on the leash causing her to fall on the affected shoulder. Patient states she did not initially go to physician in the few weeks following, thinking the shoulder was just badly bruised with pain and loss of motion present. Patient reports once she did report to MD she was placed in sling that she has worn until yesterday. Patient states being out of the sling has improved pain levels, already.       Imaging: Right shoulder x-rays from 12/1/2023: "Previously described proximal right humeral fracture is again noted.  Multiple punctate calcific densities are again seen along the superior margins of the humeral head, possibly representing small fracture fragments.  Major fragment positioning is unchanged from prior.  There is suggestion of some early marginal osseous callus " "production.  No new fractures or dislocations visualized.  Joint spaces are preserved."     Prior Therapy: n/a   Social History:  lives with their spouse  Occupation: med lab tach  Prior Level of Function: Independent and pain free with all activities of daily living   Current Level of Function: Independent though pain limited with reaching / carrying / lifting activities for cooking, cleaning, driving and work related activities of daily living     Pain:  Current 4/10, worst 10/10, best 0/10   Location: R anterior/lateral shoulder   Description: Aching  Aggravating Factors: reaching, moving to fast   Easing Factors: OTC medication, muscle relaxors     Patients goals: to return to PLOF      Medical History:   Past Medical History:   Diagnosis Date    GERD (gastroesophageal reflux disease)     Hypothyroidism, unspecified        Surgical History:   Melinda Lobo  has no past surgical history on file.    Medications:   Melinda has a current medication list which includes the following prescription(s): levothyroxine, multivitamin, omeprazole, phenazopyridine, tizanidine, venlafaxine, and vitamin d.    Allergies:   Review of patient's allergies indicates:  No Known Allergies     Objective      RANGE OF MOTION:    Shoulder AROM Right Left Pain/Dysfunction with Movement Goal   Shoulder Flexion (180) 125  FULL Pain limited  FULL R   Shoulder Extension (60) FULL FULL --- ---   Shoulder Abduction (180) 140 FULL Pain limited  FULL R    Shoulder ER (90) T4/5 T4/5  --- ---   Shoulder IR (70) T10 T8 --- ---     STRENGTH:    U/E MMT Right Left Pain/Dysfunction with Movement Goal   Shoulder Flexion 3+/5 4+/5 Pain increase  4+/5 B   Shoulder Extension 4/5 5/5 ---- 4+/5 B   Shoulder Abduction 3+/5 4+/5 Pain increase  4+/5 B   Shoulder IR 4/5 5/5 ---- 5/5 R   Shoulder ER 4/5 5/5 ---- 5/5 R   Elbow Flexion  4+/5 5/5 Pain increase 5/5 R   Elbow Extension 5/5 5/5 ---- ---       MUSCLE LENGTH:     Muscle Tested  Right "   Upper Trapezius  decreased   Pectoralis Minor  decreased   Pectoralis Major decreased     Sensation:  Sensation is intact to light touch in B upper extremities    Palpation: Increased tone and tenderness noted with palpation of R posterior cuff muscles, greater tuberosity of R shoulder, and pectoralis insertions     Posture:  Pt presents with postural abnormalities which include: forward head and rounded shoulders       FUNCTION:     FOTO Shoulder Survey    Therapist reviewed FOTO scores for Melinda Lobo on 12/15/2023.   FOTO documents entered into [employer] - see Media section.    Score: 32         Treatment     Total Treatment time (time-based codes) separate from Evaluation: 20 minutes     Melinda received the treatments listed below:      neuromuscular re-education activities to improve: Coordination, Kinesthetic Sense, Proprioception, and Posture for 10 minutes. The following activities were included:    Shoulder isometrics all directions  Scapular retractions     therapeutic activities to improve functional performance for 10  minutes, including:    HEP education     Patient Education and Home Exercises     Education provided:   - justification and explanation of HEP  - plan of care   - anatomical and biomechanical mechanisms in involved regions     Written Home Exercises Provided: yes. Exercises were reviewed and Melinda was able to demonstrate them prior to the end of the session.  Melinda demonstrated good  understanding of the education provided. See EMR under Patient Instructions for exercises provided during therapy sessions.    Assessment     Melinda is a 43 y.o. female referred to outpatient Physical Therapy with a medical diagnosis of closed displaced fracture of greater tuberosity of right humerus with routine healing. Patient presents with deficits in upper extremity range of motion, strength, endurance, functional stability, flexibility, and posture. Patient is being limited with  functional tasks at home and work involving reaching, lifting or carrying demands.     Patient prognosis is Good.   Patient will benefit from skilled outpatient Physical Therapy to address the deficits stated above and in the chart below, provide patient /family education, and to maximize patientt's level of independence.     Plan of care discussed with patient: Yes  Patient's spiritual, cultural and educational needs considered and patient is agreeable to the plan of care and goals as stated below:     Anticipated Barriers for therapy: none     Medical Necessity is demonstrated by the following  History  Co-morbidities and personal factors that may impact the plan of care [x] LOW: no personal factors / co-morbidities  [] MODERATE: 1-2 personal factors / co-morbidities  [] HIGH: 3+ personal factors / co-morbidities    Moderate / High Support Documentation: n/a     Examination  Body Structures and Functions, activity limitations and participation restrictions that may impact the plan of care [x] LOW: addressing 1-2 elements  [] MODERATE: 3+ elements  [] HIGH: 4+ elements (please support below)    Moderate / High Support Documentation: n/a     Clinical Presentation [x] LOW: stable  [] MODERATE: Evolving  [] HIGH: Unstable     Decision Making/ Complexity Score: low       Goals:      Short Term Goals:  3 weeks Progress   12/15/2023   Pain: Pt will demonstrate improved pain by reports of less than or equal to 6/10 worst pain on the verbal rating scale in order to progress toward maximal functional ability and improve QOL. PC   Function: Patient will demonstrate improved function as indicated by a functional score of greater than or equal to 45 out of 100 on FOTO. PC   HEP: Patient will demonstrate independence with HEP in order to progress toward functional independence. PC     Long Term Goals:  6 weeks Progress  12/15/2023   Pain: Pt will demonstrate improved pain by reports of less than or equal to 2/10 worst pain on  the verbal rating scale in order to progress toward maximal functional ability and improve QOL.   PC   Function: Patient will demonstrate improved function as indicated by a functional score of greater than or equal to 60 out of 100 on FOTO. PC   Mobility: Patient will improve AROM to stated goals, listed in objective measures above, in order to return to maximal functional potential and improve quality of life. PC   Strength: Patient will improve strength to stated goals, listed in objective measures above, in order to improve functional independence and quality of life. PC     Goals Key:  PC= progressing/continue; PM= partially met;        DC= discontinue  Plan     Plan of care Certification: 12/15/2023 to 1/26/2024.    Outpatient Physical Therapy 2 times weekly for 6 weeks to include the following interventions: Cervical/Lumbar Traction, Electrical Stimulation with or without FDN, Gait Training, Manual Therapy, Moist Heat/ Ice, Neuromuscular Re-ed, Orthotic Management and Training, Patient Education, Self Care, Therapeutic Activities, Therapeutic Exercise, Ultrasound, and Dry Needling .     Zoe Corbett, PT

## 2023-12-18 ENCOUNTER — CLINICAL SUPPORT (OUTPATIENT)
Dept: REHABILITATION | Facility: HOSPITAL | Age: 43
End: 2023-12-18
Payer: COMMERCIAL

## 2023-12-18 DIAGNOSIS — M25.611 DECREASED RANGE OF MOTION OF RIGHT SHOULDER: Primary | ICD-10-CM

## 2023-12-18 PROCEDURE — 97112 NEUROMUSCULAR REEDUCATION: CPT | Mod: PN

## 2023-12-18 PROCEDURE — 97140 MANUAL THERAPY 1/> REGIONS: CPT | Mod: PN

## 2023-12-18 PROCEDURE — 97110 THERAPEUTIC EXERCISES: CPT | Mod: PN

## 2023-12-18 NOTE — PROGRESS NOTES
"[facility]   Physical Therapy Treatment Note      Name: Melinda Diaz Huger  Clinic Number: [id number]    Therapy Diagnosis:   Encounter Diagnosis   Name Primary?    Decreased range of motion of right shoulder Yes     Physician: Will Wren MD    Visit Date: 12/18/2023    Physician Orders: PT Eval and Treat   Medical Diagnosis from Referral: Closed displaced fracture of greater tuberosity of right humerus with routine healing, subsequent encounter [S42.251D]   Evaluation Date: 12/15/2023  Authorization Period Expiration: 12/13/2023  Plan of Care Expiration: 1/26/2024  Progress Note Due: 1/15/2024  Visit # / Visits authorized: 1/ 5   FOTO: 1/3     Precautions: Standard     PTA Visit #: 0/5     Time In: 11:10 am   Time Out: 12:00 pm   Total Billable Time: 50 minutes (15 minutes 1:1 with trained technician)     Subjective     Pt reports: she was in significant pain over weekend after first time really moving and eval and performing HEP exercises all at once.  She was partially compliant with home exercise program.  Response to previous treatment: n/a today  Functional change: n/a today    Pain: 8/10  Location: right shoulder      Objective      Objective Measures updated at progress report unless specified.     Treatment     Melinda received the following treatments:      Melinda received therapeutic exercises to develop strength, endurance, ROM, and flexibility for (23) minutes including:     AAROM Supine wand flexion, abduction, ER x 10 B   Dynamic gentle doorway pec stretch 5 x 10" holds   Upper trapezius stretch 3 x 20"   Scaption at pulleys 3 minutes       Melinda received the following manual theapy techniques applied for (8) minutes, including:    PROM R shoulder in pain free ranges     Melinda participated in neuromuscular re-education activities to improve: Coordination, Kinesthetic, Sense, Proprioception, and Posture for (19) minutes. The following activities were " "included:    Supine 90 flexion holds 3 x 30"   Scapular retractions 2 x 12 5" holds   R shoulder isometrics all directions x 12 5" holds     Melinda participated in dynamic functional therapeutic activities to improve functional performance for 00  minutes, including:    Patient Education and Home Exercises       Education provided:   - HEP modifications as needed   - expected response to starting exercise     Written Home Exercises Provided: Patient instructed to cont prior HEP. Exercises were reviewed and Melinda was able to demonstrate them prior to the end of the session.  Melinda demonstrated good  understanding of the education provided. See EMR under Patient Instructions for exercises provided during therapy sessions    Assessment   Patient presents to first follow up session today with high pain levels after initiating movement first weekend out of sling. Started treatment focusing on mobility, flexibility and stabilizing musculature activation. Patient does well with cueing to maintain pain free ranges with mobility exercise and keeping isometric exercise submaximal. Patient encourage to utilize ice to help with pain following session today.     Melinda Is progressing well towards her goals.   Pt prognosis is Good.     Pt will continue to benefit from skilled outpatient physical therapy to address the deficits listed in the problem list box on initial evaluation, provide pt/family education and to maximize pt's level of independence in the home and community environment.     Pt's spiritual, cultural and educational needs considered and pt agreeable to plan of care and goals.     Anticipated barriers to physical therapy: none    Goals:   Short Term Goals:  3 weeks Progress   12/15/2023   Pain: Pt will demonstrate improved pain by reports of less than or equal to 6/10 worst pain on the verbal rating scale in order to progress toward maximal functional ability and improve QOL. PC   Function: Patient will " demonstrate improved function as indicated by a functional score of greater than or equal to 45 out of 100 on FOTO. PC   HEP: Patient will demonstrate independence with HEP in order to progress toward functional independence. PC      Long Term Goals:  6 weeks Progress  12/15/2023   Pain: Pt will demonstrate improved pain by reports of less than or equal to 2/10 worst pain on the verbal rating scale in order to progress toward maximal functional ability and improve QOL.   PC   Function: Patient will demonstrate improved function as indicated by a functional score of greater than or equal to 60 out of 100 on FOTO. PC   Mobility: Patient will improve AROM to stated goals, listed in objective measures above, in order to return to maximal functional potential and improve quality of life. PC   Strength: Patient will improve strength to stated goals, listed in objective measures above, in order to improve functional independence and quality of life. PC      Goals Key:  PC= progressing/continue;        PM= partially met;             DC= discontinue    Plan     Plan of care Certification: 12/15/2023 to 1/26/2024.     Outpatient Physical Therapy 2 times weekly for 6 weeks to include the following interventions: Cervical/Lumbar Traction, Electrical Stimulation with or without FDN, Gait Training, Manual Therapy, Moist Heat/ Ice, Neuromuscular Re-ed, Orthotic Management and Training, Patient Education, Self Care, Therapeutic Activities, Therapeutic Exercise, Ultrasound, and Dry Needling .     Zoe Corbett, PT

## 2023-12-18 NOTE — PROGRESS NOTES
"[facility]   Physical Therapy Treatment Note      Name: Meilnda Diaz [place]  Clinic Number: [id number]    Therapy Diagnosis:   Encounter Diagnosis   Name Primary?    Decreased range of motion of right shoulder Yes       Physician: Will Wren MD    Visit Date: 12/20/2023    Physician Orders: PT Eval and Treat   Medical Diagnosis from Referral: Closed displaced fracture of greater tuberosity of right humerus with routine healing, subsequent encounter [S42.251D]   Evaluation Date: 12/15/2023  Authorization Period Expiration: 12/13/2023  Plan of Care Expiration: 1/26/2024  Progress Note Due: 1/15/2024  Visit # / Visits authorized: 2/ 5   FOTO: 1/3     Precautions: Standard     PTA Visit #: 1/5     Time In: 1:45 pm   Time Out: 2:35 pm   Total Billable Time: 45 minutes    Subjective     Pt reports: she is having pain with sharp movements, reaching behind her and sleeping but improving well.  She was partially compliant with home exercise program.  Response to previous treatment: n/a today  Functional change: n/a today    Pain: 4/10  Location: right shoulder      Objective      Objective Measures updated at progress report unless specified.     Treatment     Melinda received the following treatments:    Melinda received therapeutic exercises to develop strength, endurance, ROM, and flexibility for (10) minutes including:     AAROM Supine wand flexion, abduction, ER x 10 B   Dynamic gentle doorway pec stretch 5 x 10" holds     Melinda received the following manual therapy techniques applied for (10) minutes, including:    PROM R shoulder in pain free ranges   Glenohumeral joint mobs    Melinda participated in neuromuscular re-education activities to improve: Coordination, Kinesthetic, Sense, Proprioception, and Posture for (25) minutes. The following activities were included:    Shoulder internal rotation YTB 2x10  Scapular protractions 2x10  Supine 90 flexion holds 3 x 30" " "  Scapular retractions 2 x 15 5" holds  R shoulder isometrics all directions x 15 5" holds     Melinda participated in dynamic functional therapeutic activities to improve functional performance for 00  minutes, including:    Patient Education and Home Exercises       Education provided:   - HEP modifications as needed   - expected response to starting exercise     Written Home Exercises Provided: Patient instructed to cont prior HEP. Exercises were reviewed and Melinda was able to demonstrate them prior to the end of the session.  Melinda demonstrated good  understanding of the education provided. See EMR under Patient Instructions for exercises provided during therapy sessions    Assessment   Patient presents to therapy with mild pain when making sharp sudden movements and difficulty sleeping. Overall range of motion has improved to near full throughout today's session following passive range of motion and mobility exercises. Began strengthening with holding into flexion without any adverse symptoms noted. Patient encouraged to utilize ice to help with pain and soreness following session today.     Melinda Is progressing well towards her goals.   Pt prognosis is Good.     Pt will continue to benefit from skilled outpatient physical therapy to address the deficits listed in the problem list box on initial evaluation, provide pt/family education and to maximize pt's level of independence in the home and community environment.     Pt's spiritual, cultural and educational needs considered and pt agreeable to plan of care and goals.     Anticipated barriers to physical therapy: none    Goals:   Short Term Goals:  3 weeks Progress   12/15/2023   Pain: Pt will demonstrate improved pain by reports of less than or equal to 6/10 worst pain on the verbal rating scale in order to progress toward maximal functional ability and improve QOL. PC   Function: Patient will demonstrate improved function as indicated by a functional " score of greater than or equal to 45 out of 100 on FOTO. PC   HEP: Patient will demonstrate independence with HEP in order to progress toward functional independence. PC      Long Term Goals:  6 weeks Progress  12/15/2023   Pain: Pt will demonstrate improved pain by reports of less than or equal to 2/10 worst pain on the verbal rating scale in order to progress toward maximal functional ability and improve QOL.   PC   Function: Patient will demonstrate improved function as indicated by a functional score of greater than or equal to 60 out of 100 on FOTO. PC   Mobility: Patient will improve AROM to stated goals, listed in objective measures above, in order to return to maximal functional potential and improve quality of life. PC   Strength: Patient will improve strength to stated goals, listed in objective measures above, in order to improve functional independence and quality of life. PC      Goals Key:  PC= progressing/continue;        PM= partially met;             DC= discontinue    Plan     Plan of care Certification: 12/15/2023 to 1/26/2024.     Outpatient Physical Therapy 2 times weekly for 6 weeks to include the following interventions: Cervical/Lumbar Traction, Electrical Stimulation with or without FDN, Gait Training, Manual Therapy, Moist Heat/ Ice, Neuromuscular Re-ed, Orthotic Management and Training, Patient Education, Self Care, Therapeutic Activities, Therapeutic Exercise, Ultrasound, and Dry Needling .     Marilyn Moulton, PTA

## 2023-12-20 ENCOUNTER — DOCUMENTATION ONLY (OUTPATIENT)
Dept: REHABILITATION | Facility: HOSPITAL | Age: 43
End: 2023-12-20
Payer: COMMERCIAL

## 2023-12-20 ENCOUNTER — CLINICAL SUPPORT (OUTPATIENT)
Dept: REHABILITATION | Facility: HOSPITAL | Age: 43
End: 2023-12-20
Payer: COMMERCIAL

## 2023-12-20 DIAGNOSIS — M25.611 DECREASED RANGE OF MOTION OF RIGHT SHOULDER: Primary | ICD-10-CM

## 2023-12-20 PROCEDURE — 97110 THERAPEUTIC EXERCISES: CPT | Mod: PN,CQ

## 2023-12-20 PROCEDURE — 97112 NEUROMUSCULAR REEDUCATION: CPT | Mod: PN,CQ

## 2023-12-20 PROCEDURE — 97140 MANUAL THERAPY 1/> REGIONS: CPT | Mod: PN,CQ

## 2023-12-20 NOTE — PROGRESS NOTES
PT/PTA met face to face to discuss pt's treatment plan and progress towards established goals. Pt will be seen by a physical therapist minimally every 6th visit or every 30 days.    Marilyn Moulton PTA

## 2023-12-27 ENCOUNTER — CLINICAL SUPPORT (OUTPATIENT)
Dept: REHABILITATION | Facility: HOSPITAL | Age: 43
End: 2023-12-27
Payer: COMMERCIAL

## 2023-12-27 DIAGNOSIS — M25.611 DECREASED RANGE OF MOTION OF RIGHT SHOULDER: Primary | ICD-10-CM

## 2023-12-27 PROCEDURE — 97140 MANUAL THERAPY 1/> REGIONS: CPT | Mod: PN

## 2023-12-27 PROCEDURE — 97112 NEUROMUSCULAR REEDUCATION: CPT | Mod: PN

## 2023-12-27 PROCEDURE — 97110 THERAPEUTIC EXERCISES: CPT | Mod: PN

## 2023-12-27 NOTE — PROGRESS NOTES
"[facility]   Physical Therapy Treatment Note      Name: Melinda Diaz [place]  Clinic Number: [id number]    Therapy Diagnosis:   Encounter Diagnosis   Name Primary?    Decreased range of motion of right shoulder Yes       Physician: Will Wren MD    Visit Date: 12/27/2023    Physician Orders: PT Eval and Treat   Medical Diagnosis from Referral: Closed displaced fracture of greater tuberosity of right humerus with routine healing, subsequent encounter [S42.251D]   Evaluation Date: 12/15/2023  Authorization Period Expiration: 12/13/2023  Plan of Care Expiration: 1/26/2024  Progress Note Due: 1/15/2024  Visit # / Visits authorized: 3/ 5   FOTO: 1/3     Precautions: Standard     PTA Visit #: 1/5     Time In: 1:25 pm   Time Out: 2:15  pm   Total Billable Time: 50 minutes    Subjective     Pt reports: pain is much better recently, though certain movements still irritate   She was partially compliant with home exercise program.  Response to previous treatment: very minimal soreness   Functional change:laying on R shoulder     Pain: 2/10  Location: right shoulder      Objective      Objective Measures updated at progress report unless specified.     Treatment     Melinda received the following treatments:    Melinda received therapeutic exercises to develop strength, endurance, ROM, and flexibility for (15) minutes including:     AAROM Supine wand flexion, abduction, ER x 15 B   Dynamic 1/2 foam roller  pec stretch 10 x 10" holds   Standing IR stretch     Melinda received the following manual therapy techniques applied for (10) minutes, including:    PROM R shoulder in pain free ranges   Glenohumeral joint mobs    Melinda participated in neuromuscular re-education activities to improve: Coordination, Kinesthetic, Sense, Proprioception, and Posture for (25) minutes. The following activities were included:    Shoulder ER/IR isometric walkouts Yellow cord x 8 B   Serratus Punches 4# " "2x10  Supine 90 flexion  ABCs red ball x 2 sets  Scapular retractions RTB 2 x 15 5" holds       Melinda participated in dynamic functional therapeutic activities to improve functional performance for 00  minutes, including:    Patient Education and Home Exercises       Education provided:   - HEP modifications as needed   - expected response to starting exercise     Written Home Exercises Provided: Patient instructed to cont prior HEP. Exercises were reviewed and Melinda was able to demonstrate them prior to the end of the session.  Melinda demonstrated good  understanding of the education provided. See EMR under Patient Instructions for exercises provided during therapy sessions    Assessment   Patient presents to therapy with reports of decreased pain overall and ability to sleep on R side. AAROM is much improved with full mobility in all ranges but abduction, with minor pain limitation at end range. Progressed mobility and stability training at the rotator cuff today due to improvements noted. Patient tolerates this well denying pain increase until final reps of isometric walkouts. Patient encouraged to utilize ice to help with pain and soreness following session today.     Melinda Is progressing well towards her goals.   Pt prognosis is Good.     Pt will continue to benefit from skilled outpatient physical therapy to address the deficits listed in the problem list box on initial evaluation, provide pt/family education and to maximize pt's level of independence in the home and community environment.     Pt's spiritual, cultural and educational needs considered and pt agreeable to plan of care and goals.     Anticipated barriers to physical therapy: none    Goals:   Short Term Goals:  3 weeks Progress   12/15/2023   Pain: Pt will demonstrate improved pain by reports of less than or equal to 6/10 worst pain on the verbal rating scale in order to progress toward maximal functional ability and improve QOL. PC "   Function: Patient will demonstrate improved function as indicated by a functional score of greater than or equal to 45 out of 100 on FOTO. PC   HEP: Patient will demonstrate independence with HEP in order to progress toward functional independence. PC      Long Term Goals:  6 weeks Progress  12/15/2023   Pain: Pt will demonstrate improved pain by reports of less than or equal to 2/10 worst pain on the verbal rating scale in order to progress toward maximal functional ability and improve QOL.   PC   Function: Patient will demonstrate improved function as indicated by a functional score of greater than or equal to 60 out of 100 on FOTO. PC   Mobility: Patient will improve AROM to stated goals, listed in objective measures above, in order to return to maximal functional potential and improve quality of life. PC   Strength: Patient will improve strength to stated goals, listed in objective measures above, in order to improve functional independence and quality of life. PC      Goals Key:  PC= progressing/continue;        PM= partially met;             DC= discontinue    Plan     Plan of care Certification: 12/15/2023 to 1/26/2024.     Outpatient Physical Therapy 2 times weekly for 6 weeks to include the following interventions: Cervical/Lumbar Traction, Electrical Stimulation with or without FDN, Gait Training, Manual Therapy, Moist Heat/ Ice, Neuromuscular Re-ed, Orthotic Management and Training, Patient Education, Self Care, Therapeutic Activities, Therapeutic Exercise, Ultrasound, and Dry Needling .     Zoe Corbett, PT

## 2023-12-29 ENCOUNTER — CLINICAL SUPPORT (OUTPATIENT)
Dept: REHABILITATION | Facility: HOSPITAL | Age: 43
End: 2023-12-29
Payer: COMMERCIAL

## 2023-12-29 DIAGNOSIS — M25.611 DECREASED RANGE OF MOTION OF RIGHT SHOULDER: Primary | ICD-10-CM

## 2023-12-29 PROCEDURE — 97112 NEUROMUSCULAR REEDUCATION: CPT | Mod: PN

## 2023-12-29 PROCEDURE — 97110 THERAPEUTIC EXERCISES: CPT | Mod: PN

## 2023-12-29 PROCEDURE — 97140 MANUAL THERAPY 1/> REGIONS: CPT | Mod: PN

## 2023-12-29 NOTE — PROGRESS NOTES
"[facility]   Physical Therapy Treatment Note      Name: Melinda Diaz [place]  Clinic Number: [id number]    Therapy Diagnosis:   Encounter Diagnosis   Name Primary?    Decreased range of motion of right shoulder Yes         Physician: Will Wren MD    Visit Date: 12/29/2023    Physician Orders: PT Eval and Treat   Medical Diagnosis from Referral: Closed displaced fracture of greater tuberosity of right humerus with routine healing, subsequent encounter [S42.251D]   Evaluation Date: 12/15/2023  Authorization Period Expiration: 12/13/2023  Plan of Care Expiration: 1/26/2024  Progress Note Due: 1/15/2024  Visit # / Visits authorized: 4/ 5   FOTO: 1/3     Precautions: Standard     PTA Visit #: 1/5     Time In: 8:45 am   Time Out: 9:30 am   Total Billable Time: 45 minutes (30 minutes 1:1 with trained technician)     Subjective     Pt reports: she was sore day of and yesterday following progressions last session. She is feeling better today though.  She was partially compliant with home exercise program.  Response to previous treatment: very minimal soreness   Functional change:laying on R shoulder     Pain: 3/10  Location: right shoulder      Objective      Objective Measures updated at progress report unless specified.     Treatment     Melinda received the following treatments:    Melinda received therapeutic exercises to develop strength, endurance, ROM, and flexibility for (12) minutes including:     AAROM Supine wand flexion, abduction, ER x 15 B   Dynamic 1/2 foam roller  pec stretch 10 x 10" holds   Standing IR stretch     Melinda received the following manual therapy techniques applied for (8) minutes, including:    PROM R shoulder in pain free ranges   Glenohumeral joint mobangel Lawrence participated in neuromuscular re-education activities to improve: Coordination, Kinesthetic, Sense, Proprioception, and Posture for (25) minutes. The following activities were " "included:    Shoulder ER/IR isometric walkouts Yellow cord x 8 B   Serratus Punches 4# 2x10  Supine 90 flexion  ABCs red ball x 2 sets  Scapular retractions RTB 2 x 15 5" holds       Melinda participated in dynamic functional therapeutic activities to improve functional performance for 00  minutes, including:    Patient Education and Home Exercises       Education provided:   - HEP modifications as needed   - expected response to starting exercise     Written Home Exercises Provided: Patient instructed to cont prior HEP. Exercises were reviewed and Melinda was able to demonstrate them prior to the end of the session.  Melinda demonstrated good  understanding of the education provided. See EMR under Patient Instructions for exercises provided during therapy sessions    Assessment   Patient presents to therapy with increased soreness following last sessions. Therefore, progressions were not made in treatment today. Patient demonstrates better tolerance to mobility and RTC functional stability training. Patient tolerates this well denying pain increase. Patient encouraged to utilize ice to help with pain and soreness following session today.     Melinda Is progressing well towards her goals.   Pt prognosis is Good.     Pt will continue to benefit from skilled outpatient physical therapy to address the deficits listed in the problem list box on initial evaluation, provide pt/family education and to maximize pt's level of independence in the home and community environment.     Pt's spiritual, cultural and educational needs considered and pt agreeable to plan of care and goals.     Anticipated barriers to physical therapy: none    Goals:   Short Term Goals:  3 weeks Progress   12/15/2023   Pain: Pt will demonstrate improved pain by reports of less than or equal to 6/10 worst pain on the verbal rating scale in order to progress toward maximal functional ability and improve QOL. PC   Function: Patient will demonstrate " improved function as indicated by a functional score of greater than or equal to 45 out of 100 on FOTO. PC   HEP: Patient will demonstrate independence with HEP in order to progress toward functional independence. PC      Long Term Goals:  6 weeks Progress  12/15/2023   Pain: Pt will demonstrate improved pain by reports of less than or equal to 2/10 worst pain on the verbal rating scale in order to progress toward maximal functional ability and improve QOL.   PC   Function: Patient will demonstrate improved function as indicated by a functional score of greater than or equal to 60 out of 100 on FOTO. PC   Mobility: Patient will improve AROM to stated goals, listed in objective measures above, in order to return to maximal functional potential and improve quality of life. PC   Strength: Patient will improve strength to stated goals, listed in objective measures above, in order to improve functional independence and quality of life. PC      Goals Key:  PC= progressing/continue;        PM= partially met;             DC= discontinue    Plan     Plan of care Certification: 12/15/2023 to 1/26/2024.     Outpatient Physical Therapy 2 times weekly for 6 weeks to include the following interventions: Cervical/Lumbar Traction, Electrical Stimulation with or without FDN, Gait Training, Manual Therapy, Moist Heat/ Ice, Neuromuscular Re-ed, Orthotic Management and Training, Patient Education, Self Care, Therapeutic Activities, Therapeutic Exercise, Ultrasound, and Dry Needling .     Zoe Corbett, PT

## 2024-01-02 ENCOUNTER — CLINICAL SUPPORT (OUTPATIENT)
Dept: REHABILITATION | Facility: HOSPITAL | Age: 44
End: 2024-01-02
Payer: COMMERCIAL

## 2024-01-02 DIAGNOSIS — M25.611 DECREASED RANGE OF MOTION OF RIGHT SHOULDER: Primary | ICD-10-CM

## 2024-01-02 PROCEDURE — 97112 NEUROMUSCULAR REEDUCATION: CPT | Mod: PN

## 2024-01-02 PROCEDURE — 97110 THERAPEUTIC EXERCISES: CPT | Mod: PN

## 2024-01-02 NOTE — PROGRESS NOTES
"[facility]   Physical Therapy Treatment Note      Name: Melinda [place]  Clinic Number: [id number]    Therapy Diagnosis:   Encounter Diagnosis   Name Primary?    Decreased range of motion of right shoulder Yes         Physician: Will Wren MD    Visit Date: 1/2/2024    Physician Orders: PT Eval and Treat   Medical Diagnosis from Referral: Closed displaced fracture of greater tuberosity of right humerus with routine healing, subsequent encounter [S42.251D]   Evaluation Date: 12/15/2023  Authorization Period Expiration: 12/13/2023  Plan of Care Expiration: 1/26/2024  Progress Note Due: 1/15/2024  Visit # / Visits authorized: 6 (1/20)    FOTO: 2/3     Precautions: Standard     PTA Visit #: 1/5     Time In: 12:55 pm   Time Out 1:45 pm   Total Billable Time: 50 minutes     Subjective     Pt reports: soreness continued last week, but was not bad. Patient has noticed herself subconsciously using the R shoulder upper extremity more again for functional tasks and having to catch herself so she does not re-injure.   She was partially compliant with home exercise program.  Response to previous treatment: very minimal soreness   Functional change:laying on R shoulder     Pain: 0/10  Location: right shoulder      Objective      Objective Measures updated at progress report unless specified.       FOTO Shoulder Survey    Therapist reviewed FOTO scores for Melinda Lobo on 1/2/2024.   FOTO documents entered into [employer] - see Media section.    Score: 60          Treatment     Melinda received the following treatments:    Melinda received therapeutic exercises to develop strength, endurance, ROM, and flexibility for (23) minutes including:     AAROM Supine wand flexion, abduction, ER x 20   Dynamic 1/2 foam roller  pec stretch 10 x 10" holds   Sleeper stretch 10 x 10"   Shoulder AROM Scaption in standing x 12   Serratus Punches 4# 2x10    Melinda received the following " "manual therapy techniques applied for (00) minutes, including:    Melinda participated in neuromuscular re-education activities to improve: Coordination, Kinesthetic, Sense, Proprioception, and Posture for (24) minutes. The following activities were included:    Shoulder ER/IR isometric walkouts Yellow cord x 10 B   Supine 90 flexion  ABCs red ball x 2 sets  Scapular retractions RTB 2 x 15 5" holds  Wall clocks YTB 2 x 5   Wall Swimmers x 10   Ulnar nerve glides x 10   Plank holds 20" box 3 x 30"     Melinda participated in dynamic functional therapeutic activities to improve functional performance for 3  minutes, including:    Farmer's carries 2 laps 10# turf     Patient Education and Home Exercises       Education provided:   - HEP modifications as needed   - expected response to starting exercise     Written Home Exercises Provided: Patient instructed to cont prior HEP. Exercises were reviewed and Melinda was able to demonstrate them prior to the end of the session.  Melinda demonstrated good  understanding of the education provided. See EMR under Patient Instructions for exercises provided during therapy sessions    Assessment   Patient presents with continued improved symptoms. Therefore, progressions in treatment were made to further mobility and RTC/scapular stability training. Patient does note some discomfort/ tingling in ulnar nerve region today so glides were added in to address this with patient tolerating well. Patient denies pain increase during session, but notes muscular fatigue. Progress is shown functionally towards goals on FOTO today.     Melinda Is progressing well towards her goals.   Pt prognosis is Good.     Pt will continue to benefit from skilled outpatient physical therapy to address the deficits listed in the problem list box on initial evaluation, provide pt/family education and to maximize pt's level of independence in the home and community environment.     Pt's spiritual, cultural and " educational needs considered and pt agreeable to plan of care and goals.     Anticipated barriers to physical therapy: none    Goals:   Short Term Goals:  3 weeks Progress   1/2/2023   Pain: Pt will demonstrate improved pain by reports of less than or equal to 6/10 worst pain on the verbal rating scale in order to progress toward maximal functional ability and improve QOL. MET   Function: Patient will demonstrate improved function as indicated by a functional score of greater than or equal to 45 out of 100 on FOTO. MET   HEP: Patient will demonstrate independence with HEP in order to progress toward functional independence. MET      Long Term Goals:  6 weeks Progress  1/2/2023   Pain: Pt will demonstrate improved pain by reports of less than or equal to 2/10 worst pain on the verbal rating scale in order to progress toward maximal functional ability and improve QOL.   PC   Function: Patient will demonstrate improved function as indicated by a functional score of greater than or equal to 60 out of 100 on FOTO. MET   Mobility: Patient will improve AROM to stated goals, listed in objective measures above, in order to return to maximal functional potential and improve quality of life. PC   Strength: Patient will improve strength to stated goals, listed in objective measures above, in order to improve functional independence and quality of life. PC      Goals Key:  PC= progressing/continue;        PM= partially met;             DC= discontinue    Plan     Plan of care Certification: 12/15/2023 to 1/26/2024.     Outpatient Physical Therapy 2 times weekly for 6 weeks to include the following interventions: Cervical/Lumbar Traction, Electrical Stimulation with or without FDN, Gait Training, Manual Therapy, Moist Heat/ Ice, Neuromuscular Re-ed, Orthotic Management and Training, Patient Education, Self Care, Therapeutic Activities, Therapeutic Exercise, Ultrasound, and Dry Needling .     Zoe Corbett, PT

## 2024-01-04 ENCOUNTER — CLINICAL SUPPORT (OUTPATIENT)
Dept: REHABILITATION | Facility: HOSPITAL | Age: 44
End: 2024-01-04
Payer: COMMERCIAL

## 2024-01-04 DIAGNOSIS — M25.611 DECREASED RANGE OF MOTION OF RIGHT SHOULDER: Primary | ICD-10-CM

## 2024-01-04 PROCEDURE — 97530 THERAPEUTIC ACTIVITIES: CPT | Mod: PN

## 2024-01-04 PROCEDURE — 97110 THERAPEUTIC EXERCISES: CPT | Mod: PN

## 2024-01-04 PROCEDURE — 97112 NEUROMUSCULAR REEDUCATION: CPT | Mod: PN

## 2024-01-05 NOTE — PROGRESS NOTES
"[facility]   Physical Therapy Treatment Note      Name: Melinda Diaz [facility]  Clinic Number: [id number]    Therapy Diagnosis:   Encounter Diagnosis   Name Primary?    Decreased range of motion of right shoulder Yes         Physician: Will Wren MD    Visit Date: 1/4/2024    Physician Orders: PT Eval and Treat   Medical Diagnosis from Referral: Closed displaced fracture of greater tuberosity of right humerus with routine healing, subsequent encounter [S42.251D]   Evaluation Date: 12/15/2023  Authorization Period Expiration: 12/13/2023  Plan of Care Expiration: 1/26/2024  Progress Note Due: 1/15/2024  Visit # / Visits authorized: 7 (1/20)    FOTO: 2/3     Precautions: Standard     PTA Visit #: 1/5     Time In: 12:45 pm   Time Out 1:35 pm   Total Billable Time: 50 minutes 1:1 with trained technician     Subjective     Pt reports: she continues to do well without significant soreness following last session.     She was partially compliant with home exercise program.  Response to previous treatment: very minimal soreness   Functional change:laying on R shoulder     Pain: 0/10  Location: right shoulder      Objective      Objective Measures updated at progress report unless specified.     Treatment     Melinda received the following treatments:    Melinda received therapeutic exercises to develop strength, endurance, ROM, and flexibility for (18) minutes including:     Shoulder flexion/abduction AROM in standing x 15 each   Dynamic 1/2 foam roller  pec stretch 10 x 10" holds   Sleeper stretch 10 x 10"   Serratus Punches 5# 2x10    Melinda received the following manual therapy techniques applied for (00) minutes, including:    Melinda participated in neuromuscular re-education activities to improve: Coordination, Kinesthetic, Sense, Proprioception, and Posture for (24) minutes. The following activities were included:    Shoulder ER/IR isometric walkouts Yellow cord x 10 B " "  Standing ball ABCs 90 flexion x 2 sets  Scapular retractions RTB 2 x 15 5" holds  Wall clocks YTB 2 x 5   Wall Swimmers x 10   Ulnar nerve glides x 10   Plank holds 20" box 3 x 30"   SL ER 3 x 10 1#    Melinda participated in dynamic functional therapeutic activities to improve functional performance for 8  minutes, including:    Farmer's carries 2 laps 10# turf   's carry 3# 2 laps on turf     Patient Education and Home Exercises       Education provided:   - HEP modifications as needed   - expected response to starting exercise     Written Home Exercises Provided: Patient instructed to cont prior HEP. Exercises were reviewed and Melinda was able to demonstrate them prior to the end of the session.  Melinda demonstrated good  understanding of the education provided. See EMR under Patient Instructions for exercises provided during therapy sessions    Assessment   Patient presents with continued improved symptoms. Therefore, progressions in treatment were made to further mobility and RTC/scapular stability training. No shoulder pain or reproduction of ulnar nerve tension is noted in today's session. Weakness and lacking RTC neuromotor control remains noted.     Melinda Is progressing well towards her goals.   Pt prognosis is Good.     Pt will continue to benefit from skilled outpatient physical therapy to address the deficits listed in the problem list box on initial evaluation, provide pt/family education and to maximize pt's level of independence in the home and community environment.     Pt's spiritual, cultural and educational needs considered and pt agreeable to plan of care and goals.     Anticipated barriers to physical therapy: none    Goals:   Short Term Goals:  3 weeks Progress   1/2/2023   Pain: Pt will demonstrate improved pain by reports of less than or equal to 6/10 worst pain on the verbal rating scale in order to progress toward maximal functional ability and improve QOL. MET   Function: " Patient will demonstrate improved function as indicated by a functional score of greater than or equal to 45 out of 100 on FOTO. MET   HEP: Patient will demonstrate independence with HEP in order to progress toward functional independence. MET      Long Term Goals:  6 weeks Progress  1/2/2023   Pain: Pt will demonstrate improved pain by reports of less than or equal to 2/10 worst pain on the verbal rating scale in order to progress toward maximal functional ability and improve QOL.   PC   Function: Patient will demonstrate improved function as indicated by a functional score of greater than or equal to 60 out of 100 on FOTO. MET   Mobility: Patient will improve AROM to stated goals, listed in objective measures above, in order to return to maximal functional potential and improve quality of life. PC   Strength: Patient will improve strength to stated goals, listed in objective measures above, in order to improve functional independence and quality of life. PC      Goals Key:  PC= progressing/continue;        PM= partially met;             DC= discontinue    Plan     Plan of care Certification: 12/15/2023 to 1/26/2024.     Outpatient Physical Therapy 2 times weekly for 6 weeks to include the following interventions: Cervical/Lumbar Traction, Electrical Stimulation with or without FDN, Gait Training, Manual Therapy, Moist Heat/ Ice, Neuromuscular Re-ed, Orthotic Management and Training, Patient Education, Self Care, Therapeutic Activities, Therapeutic Exercise, Ultrasound, and Dry Needling .     Zoe Corbett, PT

## 2024-01-09 ENCOUNTER — TELEPHONE (OUTPATIENT)
Dept: REHABILITATION | Facility: HOSPITAL | Age: 44
End: 2024-01-09
Payer: COMMERCIAL

## 2024-01-10 ENCOUNTER — OFFICE VISIT (OUTPATIENT)
Dept: SPORTS MEDICINE | Facility: CLINIC | Age: 44
End: 2024-01-10
Payer: COMMERCIAL

## 2024-01-10 VITALS — HEIGHT: 66 IN | WEIGHT: 142 LBS | BODY MASS INDEX: 22.82 KG/M2

## 2024-01-10 DIAGNOSIS — S42.251D CLOSED DISPLACED FRACTURE OF GREATER TUBEROSITY OF RIGHT HUMERUS WITH ROUTINE HEALING, SUBSEQUENT ENCOUNTER: Primary | ICD-10-CM

## 2024-01-10 PROCEDURE — 1160F RVW MEDS BY RX/DR IN RCRD: CPT | Mod: CPTII,S$GLB,, | Performed by: STUDENT IN AN ORGANIZED HEALTH CARE EDUCATION/TRAINING PROGRAM

## 2024-01-10 PROCEDURE — 99024 POSTOP FOLLOW-UP VISIT: CPT | Mod: S$GLB,,, | Performed by: STUDENT IN AN ORGANIZED HEALTH CARE EDUCATION/TRAINING PROGRAM

## 2024-01-10 PROCEDURE — 1159F MED LIST DOCD IN RCRD: CPT | Mod: CPTII,S$GLB,, | Performed by: STUDENT IN AN ORGANIZED HEALTH CARE EDUCATION/TRAINING PROGRAM

## 2024-01-10 PROCEDURE — 99999 PR PBB SHADOW E&M-EST. PATIENT-LVL III: CPT | Mod: PBBFAC,,, | Performed by: STUDENT IN AN ORGANIZED HEALTH CARE EDUCATION/TRAINING PROGRAM

## 2024-01-10 RX ORDER — IBUPROFEN 600 MG/1
TABLET ORAL DAILY PRN
COMMUNITY

## 2024-01-10 NOTE — PROGRESS NOTES
Patient ID: Melinda Lobo  YOB: 1980  MRN: [id number]    Chief Complaint: Pain of the Right Shoulder and Pain of the Right Upper Arm      History of Present Illness: Melinda Lobo is a right-hand dominant 43 y.o. female who presents today for follow up visit for right greater tuberosity fracture of the right humerus.  Patient was last seen in clinic on 12/14/2023.  Patient reports 0/10 pain today and states that she is doing well with physical therapy.  She has been mainly working on ROM at PT and that she still notices a great deal of weakness in her arm.  As far as pain, she is having no pain and is happy with her progress at this point.  She is taking OTC Ibuprofen as needed and no longer taking the muscle relaxer.     12/14/2023 Interval History of Present Illness: Melinda Lobo is a right-hand dominant 43 y.o. female who presents today with follow up for right greater tuberosity fracture of the right humerus.  She was last seen in clinic on 12/01/2023, she is still in her sling and has been taking sling off working on ROM. She says that her pain is much better than it was. She took tylenol this morning to get her through the work day. She is here today to discuss next steps of treating her fx.    12/01/2023 Interval History of Present Illness: Melinda Lobo is a right-hand dominant 43 y.o. female who presents today with follow up for right greater tuberosity fracture of the right humerus.  She was last seen in clinic on 11/15/2023 where she was placed in a sling and instructed to wear the sling at all times.  Patient reports that she has constant pain that she describes as an achy, throbbing and at times shooting pain.  She reports that it is difficult to get comfortable at night to sleep and has tried varying positions and pillows without success.  She has tried Tramadol, but states this keeps her awake and does not help with  relieving the pain.  She is able to get a few hours of sleep when she takes a muscle relaxer.  She has been using a heating pad for some symptom relief as well.     11/15/2023 Interval History of Present Illness: Melinda Lobo is a right-hand dominant 43 y.o. female who presents today with right shoulder pain. Patient presents to clinic with right shoulder pain after her great Jong tripped her about 3-4 weeks ago and she fell, Patient states that she is having a hard time lifting her arm up. Admits some weakness and pain. Pain rated 7/10, patient states she has felt tingling in her right arm and hand after laying on it wrong.  Went to [facility] urgent [facility], she states they told her that she has a pinched nerve. They prescribed muscle relaxer and prednisone. Has been taking  ibuprofen X 2 weeks with little relief.      The patient is active in none.  Occupation: [employer]      Past Medical History:   Past Medical History:   Diagnosis Date    GERD (gastroesophageal reflux disease)     Hypothyroidism, unspecified      History reviewed. No pertinent surgical history.  History reviewed. No pertinent family history.  Social History     Socioeconomic History    Marital status:    Tobacco Use    Smoking status: Never     Passive exposure: Never    Smokeless tobacco: Never   Substance and Sexual Activity    Alcohol use: Not Currently     Medication List with Changes/Refills   Current Medications    IBUPROFEN (ADVIL,MOTRIN) 600 MG TABLET    Take by mouth daily as needed.    LEVOTHYROXINE (SYNTHROID) 50 MCG TABLET    Take 1 tablet (50 mcg total) by mouth every morning.    MULTIVITAMIN (THERAGRAN) PER TABLET    Take 1 tablet by mouth every morning.    OMEPRAZOLE (PRILOSEC) 40 MG CAPSULE    Take 1 capsule by mouth in the morning.    PHENAZOPYRIDINE (PYRIDIUM) 200 MG TABLET    Take 1 tablet (200 mg total) by mouth 3 (three) times daily as needed for pain.    TIZANIDINE (ZANAFLEX) 2 MG TABLET    Take 1  tablet (2 mg total) by mouth every 6 (six) hours as needed (muscle spasm).    VENLAFAXINE (EFFEXOR-XR) 150 MG CP24    Take 1 capsule by mouth in the morning.    VITAMIN D (VITAMIN D3) 1000 UNITS TAB    Take 2,000 Units by mouth once daily.     Review of patient's allergies indicates:  No Known Allergies    Physical Exam:   Body mass index is 22.92 kg/m².    GENERAL: Well appearing, in no acute distress.  HEAD: Normocephalic and atraumatic.  ENT: External ears and nose grossly normal.  EYES: EOMI bilaterally  PULMONARY: Respirations are grossly even and non-labored.  NEURO: Awake, alert, and oriented x 3.  SKIN: No obvious rashes appreciated.  PSYCH: Mood & affect are appropriate.    Detailed MSK exam:     Right shoulder exam:   -ROM: abduction 130, forward flexion 130, external rotation 90, internal rotation 80  -empty can test negative, resisted ER negative, belly press negative  -victoria test negative, neers test negative, whipple test negative  -biceps load test negative, yerguson test negative, Lyons's test negative  -sensation intact, pulses 2+  -TTP: none    Left shoulder exam:   -ROM: abduction 130, forward flexion 130, external rotation 90, internal rotation 80  -empty can test negative, resisted ER negative, belly press negative  -victoria test negative, neers test negative, whipple test negative  -biceps load test negative, yerguson test negative, Lyons's test negative  -sensation intact, pulses 2+  -TTP: none      Imaging:  X-ray Shoulder 2 or More Views Right  Narrative: EXAMINATION:  XR SHOULDER COMPLETE 2 OR MORE VIEWS RIGHT    CLINICAL HISTORY:  Displaced fracture of greater tuberosity of right humerus, initial encounter for closed fracture    TECHNIQUE:  Two or three views of the right shoulder were preformed.    COMPARISON:  11/15/2023    FINDINGS:  Previously described proximal right humeral fracture is again noted.  Multiple punctate calcific densities are again seen along the superior margins  of the humeral head, possibly representing small fracture fragments.  Major fragment positioning is unchanged from prior.  There is suggestion of some early marginal osseous callus production.  No new fractures or dislocations visualized.  Joint spaces are preserved.  Impression: As above    Electronically signed by: Geraldo Pettit MD  Date:    12/01/2023  Time:    13:38        Relevant imaging results were reviewed and interpreted by me and per my read shows interval healing right proximal humeral fracture.  This was discussed with the patient and / or family today.     Assessment:  Melinda Lobo is a 43 y.o. female following up for right greater tuberosity fracture. No pain currently. Doing well with PT. Good ROM. Exam reassuring today.   Plan: continue PT and home exercises. Activity as tolerated. Continue conservative management for pain.   Follow up as needed. All questions answered.     Closed displaced fracture of greater tuberosity of right humerus with routine healing, subsequent encounter             Electronically signed:  Will Wren MD, MPH  01/10/2024  8:22 AM

## 2024-01-11 NOTE — PROGRESS NOTES
[facility]   Physical Therapy Treatment Note      Name: Melinda Diaz [facility] Number: [id number]    Therapy Diagnosis:   Encounter Diagnosis   Name Primary?    Decreased range of motion of right shoulder Yes       Physician: Will Wren MD    Visit Date: 1/12/2024    Physician Orders: PT Eval and Treat   Medical Diagnosis from Referral: Closed displaced fracture of greater tuberosity of right humerus with routine healing, subsequent encounter [S42.251D]   Evaluation Date: 12/15/2023  Authorization Period Expiration: 12/13/2023  Plan of Care Expiration: 1/26/2024  Progress Note Due: 1/15/2024  Visit # / Visits authorized: 8 (3/20)    FOTO: 2/3     Precautions: Standard     PTA Visit #: 1/5     Time In: 11:10 am   Time Out 12:00 pm   Total Billable Time: 50 minutes     Subjective     Pt reports: she had a follow up with MD since last seen. Patient states he said she was good to go in terms of medical care for shoulder, so she would just like to improve strength a little more before discharge.     She was partially compliant with home exercise program.  Response to previous treatment: very minimal soreness   Functional change:laying on R shoulder     Pain: 0/10  Location: right shoulder      Objective      Objective Measures updated at progress report unless specified.     Treatment     Melinda received the following treatments:    Melinda received therapeutic exercises to develop strength, endurance, ROM, and flexibility for (15) minutes including:     SL ER 3 x 10 2#  Serratus slides YTB 2 x 10   Upright rows 2 x 10 30#   Arnold press 3# 2 x12     Melinda received the following manual therapy techniques applied for (00) minutes, including:    Melinda participated in neuromuscular re-education activities to improve: Coordination, Kinesthetic, Sense, Proprioception, and Posture for (23) minutes. The following activities were included:    Shoulder ER/IR isometric walkouts  "Yellow cord x 12 B   Standing ball ABCs 90 flexion x 2 sets  Wall clocks YTB 2 x 5   Wall Swimmers x 12  Plank holds 20" box 3 x 30"   D2 2# x 12     Melinda participated in dynamic functional therapeutic activities to improve functional performance for 12  minutes, including:    Farmer's carries 1 lap of gym 10#   's carry 3# 2 laps of gym   Functional reaching in scaption 2# x 15     Patient Education and Home Exercises       Education provided:   - HEP modifications as needed   - expected response to starting exercise     Written Home Exercises Provided: Patient instructed to cont prior HEP. Exercises were reviewed and Melinda was able to demonstrate them prior to the end of the session.  Melinda demonstrated good  understanding of the education provided. See EMR under Patient Instructions for exercises provided during therapy sessions    Assessment   Patient presents with continued improved symptoms and reported good follow up with MD. Therefore, progressions in treatment were made to further strength and RTC/scapular stability training. No shoulder pain is reported in today's session. Weakness and lacking RTC neuromotor control remains noted.     Melinda Is progressing well towards her goals.   Pt prognosis is Good.     Pt will continue to benefit from skilled outpatient physical therapy to address the deficits listed in the problem list box on initial evaluation, provide pt/family education and to maximize pt's level of independence in the home and community environment.     Pt's spiritual, cultural and educational needs considered and pt agreeable to plan of care and goals.     Anticipated barriers to physical therapy: none    Goals:   Short Term Goals:  3 weeks Progress   1/2/2023   Pain: Pt will demonstrate improved pain by reports of less than or equal to 6/10 worst pain on the verbal rating scale in order to progress toward maximal functional ability and improve QOL. MET   Function: Patient will " demonstrate improved function as indicated by a functional score of greater than or equal to 45 out of 100 on FOTO. MET   HEP: Patient will demonstrate independence with HEP in order to progress toward functional independence. MET      Long Term Goals:  6 weeks Progress  1/2/2023   Pain: Pt will demonstrate improved pain by reports of less than or equal to 2/10 worst pain on the verbal rating scale in order to progress toward maximal functional ability and improve QOL.   PC   Function: Patient will demonstrate improved function as indicated by a functional score of greater than or equal to 60 out of 100 on FOTO. MET   Mobility: Patient will improve AROM to stated goals, listed in objective measures above, in order to return to maximal functional potential and improve quality of life. PC   Strength: Patient will improve strength to stated goals, listed in objective measures above, in order to improve functional independence and quality of life. PC      Goals Key:  PC= progressing/continue;        PM= partially met;             DC= discontinue    Plan     Plan of care Certification: 12/15/2023 to 1/26/2024.     Outpatient Physical Therapy 2 times weekly for 6 weeks to include the following interventions: Cervical/Lumbar Traction, Electrical Stimulation with or without FDN, Gait Training, Manual Therapy, Moist Heat/ Ice, Neuromuscular Re-ed, Orthotic Management and Training, Patient Education, Self Care, Therapeutic Activities, Therapeutic Exercise, Ultrasound, and Dry Needling .     Zoe Corbett, PT

## 2024-01-12 ENCOUNTER — CLINICAL SUPPORT (OUTPATIENT)
Dept: REHABILITATION | Facility: HOSPITAL | Age: 44
End: 2024-01-12
Payer: COMMERCIAL

## 2024-01-12 DIAGNOSIS — M25.611 DECREASED RANGE OF MOTION OF RIGHT SHOULDER: Primary | ICD-10-CM

## 2024-01-12 PROCEDURE — 97112 NEUROMUSCULAR REEDUCATION: CPT | Mod: PN

## 2024-01-12 PROCEDURE — 97530 THERAPEUTIC ACTIVITIES: CPT | Mod: PN

## 2024-01-12 PROCEDURE — 97110 THERAPEUTIC EXERCISES: CPT | Mod: PN

## 2024-01-16 NOTE — PROGRESS NOTES
[facility]  Physical Therapy Discharge Note    Name: Melinda Diaz [facility]  Clinic Number: [id number]    Therapy Diagnosis:   Encounter Diagnosis   Name Primary?    Decreased range of motion of right shoulder Yes     Physician: Will Wren MD       Physician Orders: PT Eval and Treat   Medical Diagnosis from Referral: Closed displaced fracture of greater tuberosity of right humerus with routine healing, subsequent encounter [S42.251D]   Evaluation Date: 12/15/2023  Authorization Period Expiration: 12/13/2023  Plan of Care Expiration: 1/26/2024  Progress Note Due: 1/15/2024  Visit # / Visits authorized: 4 (9/20)    FOTO: 2/3     Precautions: Standard      PTA Visit #: 1/5      Time In: 1:32 pm   Time Out 2:15 pm   Total Billable Time: 43 minutes      Subjective      Pt reports: she continues to feel better without any true pain recently. She denies any limitations in function at this point. She reports some stiffness at the R pec, intermittently.    She was compliant with home exercise program.  Response to previous treatment: very minimal soreness      Pain: 0/10  Location: right shoulder       Objective       RANGE OF MOTION:     Shoulder AROM Right Left Pain/Dysfunction with Movement 1/17 Goal   Shoulder Flexion (180) 125  FULL Pain limited  FULL R FULL R   Shoulder Abduction (180) 140 FULL Pain limited  FULL R FULL R       STRENGTH:     U/E MMT Right Left Pain/Dysfunction with Movement 1/17 Goal   Shoulder Flexion 3+/5 4+/5 Pain increase  4/5 R; 4+/5 L 4+/5 B   Shoulder Extension 4/5 5/5 ---- 4+/ 5 R 4+/5 B   Shoulder Abduction 3+/5 4+/5 Pain increase  4+/5 R  4+/5 B   Shoulder IR 4/5 5/5 ---- 5/5 R 5/5 R   Shoulder ER 4/5 5/5 ---- 5/5 R 5/5 R   Elbow Flexion  4+/5 5/5 Pain increase 5/5 R 5/5 R      Treatment      Melinda received the following treatments:     Melinda received therapeutic exercises to develop strength, endurance, ROM, and flexibility for (12) minutes  "including:      SL ER 3 x 10 2#  Serratus slides YTB 2 x 10   Upright rows 2 x 12 30#   Arnold press 3# 2 x12      Melinda received the following manual therapy techniques applied for (00) minutes, including:     Melinda participated in neuromuscular re-education activities to improve: Coordination, Kinesthetic, Sense, Proprioception, and Posture for (23) minutes. The following activities were included:     Shoulder ER/IR isometric walkouts Yellow cord x 12 B   Standing ball ABCs 90 flexion x 2 sets  Wall clocks YTB 2 x 5   Wall Swimmers x 12  Plank holds 20" box 3 x 30"   D2 YTB  2x 10      Melinda participated in dynamic functional therapeutic activities to improve functional performance for 8  minutes, including:     Farmer's carries 1 lap of gym 10#   's carry 5# 2 laps of gym      Patient Education and Home Exercises        Education provided:   - HEP modifications as needed   - expected response to starting exercise      Written Home Exercises Provided: Patient instructed to cont prior HEP. Exercises were reviewed and Melinda was able to demonstrate them prior to the end of the session.  Melinda demonstrated good  understanding of the education provided. See EMR under Patient Instructions for exercises provided during therapy sessions     ASSESSMENT    Melinda Lobo is a 43 y.o. female who has completed 9 sessions of formal outpatient physical therapy for treatment of closed displaced fracture of greater tuberosity of right humerus . Patient has since shown significant improvement in pain, upper extremity strength and range of motion, allowing for better function with lifting, reaching, and carrying activity.  FOTO scores assessed today show Melinda Lobo's overall functional status has greatly improved with physical therapy treatment. Any residual limitations will continue to be addressed with home exercise program. Patient demonstrates independence with home program prior to " discharge for safe transition to independent phase of care. Patient educated to follow up with MD if symptoms return or worsen.      Discharge reason: Patient is asymptomatic and no longer limited with activities of daily living      Discharge FOTO Score:     FOTO Shoulder Survey    Therapist reviewed FOTO scores for Melinda Lobo on 1/17/2024.   FOTO documents entered into [employer] - see Media section.    Score: 81        Goals:   Short Term Goals:  3 weeks Progress   1/17/2024   Pain: Pt will demonstrate improved pain by reports of less than or equal to 6/10 worst pain on the verbal rating scale in order to progress toward maximal functional ability and improve QOL. MET   Function: Patient will demonstrate improved function as indicated by a functional score of greater than or equal to 45 out of 100 on FOTO. MET   HEP: Patient will demonstrate independence with HEP in order to progress toward functional independence. MET      Long Term Goals:  6 weeks Progress  1/17/2024   Pain: Pt will demonstrate improved pain by reports of less than or equal to 2/10 worst pain on the verbal rating scale in order to progress toward maximal functional ability and improve QOL.   MET   Function: Patient will demonstrate improved function as indicated by a functional score of greater than or equal to 60 out of 100 on FOTO. MET   Mobility: Patient will improve AROM to stated goals, listed in objective measures above, in order to return to maximal functional potential and improve quality of life. MET   Strength: Patient will improve strength to stated goals, listed in objective measures above, in order to improve functional independence and quality of life. PM      Goals Key:  PC= progressing/continue;        PM= partially met;             DC= discontinue    PLAN   This patient is discharged from Physical Therapy      Zoe Corbett, PT

## 2024-01-17 ENCOUNTER — CLINICAL SUPPORT (OUTPATIENT)
Dept: REHABILITATION | Facility: HOSPITAL | Age: 44
End: 2024-01-17
Payer: COMMERCIAL

## 2024-01-17 DIAGNOSIS — M25.611 DECREASED RANGE OF MOTION OF RIGHT SHOULDER: Primary | ICD-10-CM

## 2024-01-17 PROCEDURE — 97530 THERAPEUTIC ACTIVITIES: CPT | Mod: PN

## 2024-01-17 PROCEDURE — 97112 NEUROMUSCULAR REEDUCATION: CPT | Mod: PN

## 2024-01-17 PROCEDURE — 97110 THERAPEUTIC EXERCISES: CPT | Mod: PN

## 2024-04-09 ENCOUNTER — TELEPHONE (OUTPATIENT)
Dept: GASTROENTEROLOGY | Facility: CLINIC | Age: 44
End: 2024-04-09
Payer: COMMERCIAL

## 2024-04-09 ENCOUNTER — PATIENT MESSAGE (OUTPATIENT)
Dept: GASTROENTEROLOGY | Facility: CLINIC | Age: 44
End: 2024-04-09

## 2024-04-09 NOTE — TELEPHONE ENCOUNTER
Spoke to patient who accidentally missed her GI appointment today. Patient is requesting an order for screening Colonoscopy recommended by her PCP. Patient is asymptomatic but does have a family hx of colon cancer.

## 2024-04-10 ENCOUNTER — PATIENT MESSAGE (OUTPATIENT)
Dept: GASTROENTEROLOGY | Facility: CLINIC | Age: 44
End: 2024-04-10
Payer: COMMERCIAL

## 2024-04-12 ENCOUNTER — OFFICE VISIT (OUTPATIENT)
Dept: GASTROENTEROLOGY | Facility: CLINIC | Age: 44
End: 2024-04-12
Payer: COMMERCIAL

## 2024-04-12 ENCOUNTER — PATIENT MESSAGE (OUTPATIENT)
Dept: GASTROENTEROLOGY | Facility: CLINIC | Age: 44
End: 2024-04-12

## 2024-04-12 DIAGNOSIS — K63.5 POLYP OF COLON, UNSPECIFIED PART OF COLON, UNSPECIFIED TYPE: ICD-10-CM

## 2024-04-12 DIAGNOSIS — Z80.0 FAMILY HISTORY OF COLON CANCER: Primary | ICD-10-CM

## 2024-04-12 PROCEDURE — 99204 OFFICE O/P NEW MOD 45 MIN: CPT | Mod: 95,,, | Performed by: INTERNAL MEDICINE

## 2024-04-12 PROCEDURE — 1160F RVW MEDS BY RX/DR IN RCRD: CPT | Mod: CPTII,95,, | Performed by: INTERNAL MEDICINE

## 2024-04-12 PROCEDURE — 1159F MED LIST DOCD IN RCRD: CPT | Mod: CPTII,95,, | Performed by: INTERNAL MEDICINE

## 2024-04-12 NOTE — PROGRESS NOTES
Addendum:   -colonoscopy in 2020: one 11mm polyp in SC (TA). Repeat colon in 3 years.   -EGD in 2020: erythema in the distal esophagus (-BM), erythematous mucosa in the antrum (-HP, -IM) , normal duodenum.                 Clinic Consult:  [facility] Consultation Note    Reason for Consult:  The primary encounter diagnosis was Family history of colon cancer. A diagnosis of Polyp of colon, unspecified part of colon, unspecified type was also pertinent to this visit.    PCP: No, Primary Doctor   No address on file    20 minutes of total time spent on the encounter, which includes face to face time and non-face to face time preparing to see the patient (eg, review of tests), Obtaining and/or reviewing separately obtained history, Documenting clinical information in the electronic or other health record, Independently interpreting results (not separately reported) and communicating results to the patient/family/caregiver, or Care coordination (not separately reported).   Each patient to whom he or she provides medical services by telemedicine is: (1) informed of the relationship between the physician and patient and the respective role of any other health care provider with respect to management of the patient; and (2) notified that he or she may decline to receive medical services by telemedicine and may withdraw from such care at any time       HPI:  This is a 43 y.o. female here for evaluation of family history of colon cancer.   Patient's father had colon cancer and  in his late 50s.   She had a polyp on colonoscopy in  and was instructed to repeat colonoscopy in 3 years.   She is denying any GI symptoms.   She is not on anticoagulation or antiplatelets.       ROS:  As above HPI       Medical History:   Past Medical History:   Diagnosis Date    GERD (gastroesophageal reflux disease)     Hypothyroidism, unspecified        Surgical History:  No past surgical history on file.    Family History:    No family history on file.    Social History:   Social History     Tobacco Use    Smoking status: Never     Passive exposure: Never    Smokeless tobacco: Never   Substance Use Topics    Alcohol use: Not Currently       Allergies: Reviewed    Home Medications:   Medication List with Changes/Refills   New Medications    SODIUM,POTASSIUM,MAG SULFATES (SUPREP BOWEL PREP KIT) 17.5-3.13-1.6 GRAM SOLR    Take 177 mLs by mouth once daily. for 2 days   Current Medications    IBUPROFEN (ADVIL,MOTRIN) 600 MG TABLET    Take by mouth daily as needed.    LEVOTHYROXINE (SYNTHROID) 50 MCG TABLET    Take 1 tablet (50 mcg total) by mouth every morning.    LEVOTHYROXINE (SYNTHROID) 50 MCG TABLET    Take 1 tablet by mouth every morning. must have an appt for more refills    MULTIVITAMIN (THERAGRAN) PER TABLET    Take 1 tablet by mouth every morning.    OMEPRAZOLE (PRILOSEC) 40 MG CAPSULE    Take 1 capsule by mouth in the morning.    PHENAZOPYRIDINE (PYRIDIUM) 200 MG TABLET    Take 1 tablet (200 mg total) by mouth 3 (three) times daily as needed for pain.    TIZANIDINE (ZANAFLEX) 2 MG TABLET    Take 1 tablet (2 mg total) by mouth every 6 (six) hours as needed (muscle spasm).    VENLAFAXINE (EFFEXOR-XR) 150 MG CP24    Take 1 capsule by mouth in the morning.    VITAMIN D (VITAMIN D3) 1000 UNITS TAB    Take 2,000 Units by mouth once daily.         Physical Exam:  Vital Signs:  There were no vitals taken for this visit.  There is no height or weight on file to calculate BMI.    Physical Exam  Vitals reviewed: virtual visit.          Labs: Pertinent labs reviewed.      Assessment:  Problem List Items Addressed This Visit          Oncology    Family history of colon cancer - Primary    Current Assessment & Plan     Plan:   -Colonoscopy ordered             GI    Colon polyp    Current Assessment & Plan     Plan:   -Will request previous endoscopy report   - Colonoscopy ordered           Family history of colon cancer  -     Ambulatory  referral/consult to Endo Procedure ; Future; Expected date: 04/13/2024    Polyp of colon, unspecified part of colon, unspecified type                Follow up if symptoms worsen or fail to improve.    Order summary:  Orders Placed This Encounter   Procedures    Ambulatory referral/consult to Endo Procedure        Thank you so much for allowing me to participate in the care of Melinda Joe Meier MD  Gastroenterology and Hepatology  [facility]

## 2024-04-15 ENCOUNTER — HOSPITAL ENCOUNTER (OUTPATIENT)
Dept: PREADMISSION TESTING | Facility: HOSPITAL | Age: 44
Discharge: HOME | End: 2024-04-15
Attending: INTERNAL MEDICINE
Payer: COMMERCIAL

## 2024-04-15 DIAGNOSIS — Z80.0 FAMILY HISTORY OF COLON CANCER: Primary | ICD-10-CM

## 2024-04-15 RX ORDER — SODIUM, POTASSIUM,MAG SULFATES 17.5-3.13G
1 SOLUTION, RECONSTITUTED, ORAL ORAL DAILY
Qty: 354 ML | Refills: 0 | Status: SHIPPED | OUTPATIENT
Start: 2024-04-15 | End: 2024-04-18

## 2024-04-16 ENCOUNTER — LAB VISIT (OUTPATIENT)
Dept: LAB | Facility: HOSPITAL | Age: 44
End: 2024-04-16
Attending: INTERNAL MEDICINE
Payer: COMMERCIAL

## 2024-04-16 ENCOUNTER — TELEPHONE (OUTPATIENT)
Dept: GASTROENTEROLOGY | Facility: CLINIC | Age: 44
End: 2024-04-16
Payer: COMMERCIAL

## 2024-04-16 DIAGNOSIS — E03.9 HYPOTHYROIDISM, ADULT: Primary | ICD-10-CM

## 2024-04-16 DIAGNOSIS — E03.9 HYPOTHYROIDISM, ADULT: ICD-10-CM

## 2024-04-16 PROBLEM — Z80.0 FAMILY HISTORY OF COLON CANCER: Status: ACTIVE | Noted: 2024-04-16

## 2024-04-16 PROBLEM — K63.5 COLON POLYP: Status: ACTIVE | Noted: 2024-04-16

## 2024-04-16 LAB
T4 FREE SERPL-MCNC: 0.98 NG/DL (ref 0.71–1.51)
TSH SERPL DL<=0.005 MIU/L-ACNC: 0.41 UIU/ML (ref 0.4–4)

## 2024-04-16 PROCEDURE — 84443 ASSAY THYROID STIM HORMONE: CPT | Performed by: INTERNAL MEDICINE

## 2024-04-16 PROCEDURE — 84439 ASSAY OF FREE THYROXINE: CPT | Performed by: INTERNAL MEDICINE

## 2024-04-16 PROCEDURE — 36415 COLL VENOUS BLD VENIPUNCTURE: CPT | Performed by: INTERNAL MEDICINE

## 2024-04-18 ENCOUNTER — TELEPHONE (OUTPATIENT)
Dept: GASTROENTEROLOGY | Facility: CLINIC | Age: 44
End: 2024-04-18
Payer: COMMERCIAL

## 2024-04-19 ENCOUNTER — PATIENT MESSAGE (OUTPATIENT)
Dept: RESEARCH | Facility: HOSPITAL | Age: 44
End: 2024-04-19
Payer: COMMERCIAL

## 2024-05-24 ENCOUNTER — ANESTHESIA (OUTPATIENT)
Dept: ENDOSCOPY | Facility: HOSPITAL | Age: 44
End: 2024-05-24
Payer: COMMERCIAL

## 2024-05-24 ENCOUNTER — ANESTHESIA EVENT (OUTPATIENT)
Dept: ENDOSCOPY | Facility: HOSPITAL | Age: 44
End: 2024-05-24
Payer: COMMERCIAL

## 2024-05-24 ENCOUNTER — HOSPITAL ENCOUNTER (OUTPATIENT)
Facility: HOSPITAL | Age: 44
Discharge: HOME | End: 2024-05-24
Attending: INTERNAL MEDICINE | Admitting: INTERNAL MEDICINE
Payer: COMMERCIAL

## 2024-05-24 DIAGNOSIS — K63.5 POLYP OF COLON: ICD-10-CM

## 2024-05-24 DIAGNOSIS — K63.5 POLYP OF COLON, UNSPECIFIED PART OF COLON, UNSPECIFIED TYPE: Primary | ICD-10-CM

## 2024-05-24 LAB
B-HCG UR QL: NEGATIVE
CTP QC/QA: YES

## 2024-05-24 PROCEDURE — 25000003 PHARM REV CODE 250: Performed by: INTERNAL MEDICINE

## 2024-05-24 PROCEDURE — 27201089 HC SNARE, DISP (ANY): Performed by: INTERNAL MEDICINE

## 2024-05-24 PROCEDURE — 37000008 HC ANESTHESIA 1ST 15 MINUTES: Performed by: INTERNAL MEDICINE

## 2024-05-24 PROCEDURE — 88305 TISSUE EXAM BY PATHOLOGIST: CPT | Mod: 26,,, | Performed by: PATHOLOGY

## 2024-05-24 PROCEDURE — 45385 COLONOSCOPY W/LESION REMOVAL: CPT | Mod: 33,,, | Performed by: INTERNAL MEDICINE

## 2024-05-24 PROCEDURE — 27200997: Performed by: INTERNAL MEDICINE

## 2024-05-24 PROCEDURE — 25000003 PHARM REV CODE 250: Performed by: NURSE ANESTHETIST, CERTIFIED REGISTERED

## 2024-05-24 PROCEDURE — 63600175 PHARM REV CODE 636 W HCPCS: Performed by: NURSE ANESTHETIST, CERTIFIED REGISTERED

## 2024-05-24 PROCEDURE — 88305 TISSUE EXAM BY PATHOLOGIST: CPT | Performed by: PATHOLOGY

## 2024-05-24 PROCEDURE — 45385 COLONOSCOPY W/LESION REMOVAL: CPT | Mod: PT | Performed by: INTERNAL MEDICINE

## 2024-05-24 PROCEDURE — 37000009 HC ANESTHESIA EA ADD 15 MINS: Performed by: INTERNAL MEDICINE

## 2024-05-24 PROCEDURE — 81025 URINE PREGNANCY TEST: CPT | Performed by: INTERNAL MEDICINE

## 2024-05-24 RX ORDER — DEXTROMETHORPHAN/PSEUDOEPHED 2.5-7.5/.8
DROPS ORAL
Status: DISCONTINUED | OUTPATIENT
Start: 2024-05-24 | End: 2024-05-24 | Stop reason: HOSPADM

## 2024-05-24 RX ORDER — PROPOFOL 10 MG/ML
VIAL (ML) INTRAVENOUS
Status: DISCONTINUED | OUTPATIENT
Start: 2024-05-24 | End: 2024-05-24

## 2024-05-24 RX ORDER — LIDOCAINE HYDROCHLORIDE 10 MG/ML
INJECTION, SOLUTION EPIDURAL; INFILTRATION; INTRACAUDAL; PERINEURAL
Status: DISCONTINUED | OUTPATIENT
Start: 2024-05-24 | End: 2024-05-24

## 2024-05-24 RX ADMIN — PROPOFOL 20 MG: 10 INJECTION, EMULSION INTRAVENOUS at 11:05

## 2024-05-24 RX ADMIN — SODIUM CHLORIDE, SODIUM LACTATE, POTASSIUM CHLORIDE, AND CALCIUM CHLORIDE: .6; .31; .03; .02 INJECTION, SOLUTION INTRAVENOUS at 10:05

## 2024-05-24 RX ADMIN — PROPOFOL 30 MG: 10 INJECTION, EMULSION INTRAVENOUS at 11:05

## 2024-05-24 RX ADMIN — LIDOCAINE HYDROCHLORIDE 50 MG: 10 SOLUTION INTRAVENOUS at 10:05

## 2024-05-24 RX ADMIN — PROPOFOL 80 MG: 10 INJECTION, EMULSION INTRAVENOUS at 10:05

## 2024-05-24 NOTE — PLAN OF CARE
Dr Meier came to bedside and discussed findings. NO N/V,  no abdominal pain, no GI bleeding, and vitals stable.  Pt discharged from unit.   Detail Level: Detailed

## 2024-05-24 NOTE — ANESTHESIA POSTPROCEDURE EVALUATION
Anesthesia Post Evaluation    Patient: Melinda Lobo    Procedure(s) Performed: Procedure(s) (LRB):  COLONOSCOPY (N/A)    Final Anesthesia Type: MAC      Patient location during evaluation: PACU  Patient participation: Yes- Able to Participate  Level of consciousness: awake and alert  Post-procedure vital signs: reviewed and stable  Pain management: adequate  Airway patency: patent    PONV status at discharge: No PONV  Anesthetic complications: no      Cardiovascular status: blood pressure returned to baseline  Respiratory status: unassisted and room air  Hydration status: euvolemic  Follow-up not needed.              Vitals Value Taken Time   /81 05/24/24 1135   Temp 36.1 °C (97 °F) 05/24/24 1120   Pulse 88 05/24/24 1135   Resp 18 05/24/24 1135   SpO2 100 % 05/24/24 1135         Event Time   Out of Recovery 11:57:25         Pain/Alexis Score: Alexis Score: 10 (5/24/2024 11:35 AM)

## 2024-05-24 NOTE — ANESTHESIA PREPROCEDURE EVALUATION
05/24/2024  Melinda Lobo is a 44 y.o., female.      Pre-op Assessment    I have reviewed the Patient Summary Reports.     I have reviewed the Nursing Notes. I have reviewed the NPO Status.   I have reviewed the Medications.     Review of Systems  Anesthesia Hx:  No problems with previous Anesthesia             Denies Family Hx of Anesthesia complications.    Denies Personal Hx of Anesthesia complications.                    Social:  Non-Smoker       Hematology/Oncology:  Hematology Normal   Oncology Normal                                   EENT/Dental:  EENT/Dental Normal           Cardiovascular:  Cardiovascular Normal Exercise tolerance: good                                           Pulmonary:  Pulmonary Normal                       Renal/:  Renal/ Normal                 Hepatic/GI:     GERD             Musculoskeletal:  Musculoskeletal Normal                Neurological:  Neurology Normal                                      Endocrine:   Hypothyroidism          Dermatological:  Skin Normal    Psych:  Psychiatric Normal                  Physical Exam  General: Well nourished, Cooperative, Alert and Oriented    Airway:  Mallampati: II   Mouth Opening: Normal  TM Distance: Normal  Tongue: Normal  Neck ROM: Normal ROM    Dental:  Intact    Chest/Lungs:  Clear to auscultation, Normal Respiratory Rate    Heart:  Rate: Normal  Rhythm: Regular Rhythm    Anesthesia Plan  Type of Anesthesia, risks & benefits discussed:    Anesthesia Type: MAC  Intra-op Monitoring Plan: Standard ASA Monitors  Induction:  IV  Informed Consent: Informed consent signed with the Patient and all parties understand the risks and agree with anesthesia plan.  All questions answered.   ASA Score: 2  Day of Surgery Review of History & Physical: H&P Update referred to the surgeon/provider.I have interviewed and examined the  patient. I have reviewed the patient's H&P dated: There are no significant changes.     Ready For Surgery From Anesthesia Perspective.   .

## 2024-05-24 NOTE — H&P
PRE PROCEDURE H&P    Patient Name: Melinda Lobo  MRN: [id number]  : 1980  Date of Procedure:  2024  Referring Physician: Leslee Meier MD  Primary Physician: Jahaira, Primary Doctor  Procedure Physician: Leslee Meier MD       Planned Procedure: Colonoscopy  Diagnosis: previous adenomatous polyp  family history of colon cancer  Chief Complaint: Same as above    HPI: Patient is an 44 y.o. female is here for the above.   This is a 44 y.o. female here for evaluation of family history of colon cancer.   Patient's father had colon cancer and  in his late 50s.   She had a polyp on colonoscopy in  and was instructed to repeat colonoscopy in 3 years.   She is denying any GI symptoms.   She is not on anticoagulation or antiplatelets.       Past Medical History:   Past Medical History:   Diagnosis Date    GERD (gastroesophageal reflux disease)     Hypothyroidism, unspecified         Past Surgical History:  History reviewed. No pertinent surgical history.     Home Medications:  Prior to Admission medications    Medication Sig Start Date End Date Taking? Authorizing Provider   ibuprofen (ADVIL,MOTRIN) 600 MG tablet Take by mouth daily as needed.   Yes Provider, Historical   levothyroxine (SYNTHROID) 50 MCG tablet Take 1 tablet (50 mcg total) by mouth every morning. 23  Yes Rishi Byrd MD   levothyroxine (SYNTHROID) 50 MCG tablet TAKE 1 TABLET BY MOUTH EVERY MORNING 24  Yes Rishi Byrd MD   multivitamin (THERAGRAN) per tablet Take 1 tablet by mouth every morning.   Yes Provider, Historical   omeprazole (PRILOSEC) 40 MG capsule Take 1 capsule by mouth in the morning. 23  Yes    venlafaxine (EFFEXOR-XR) 150 MG Cp24 Take 1 capsule by mouth in the morning. 23  Yes    vitamin D (VITAMIN D3) 1000 units Tab Take 2,000 Units by mouth once daily.   Yes Provider, Historical   phenazopyridine (PYRIDIUM) 200 MG tablet Take 1 tablet (200 mg total) by mouth 3  (three) times daily as needed for pain.  Patient not taking: Reported on 12/1/2023 10/3/23      tiZANidine (ZANAFLEX) 2 MG tablet Take 1 tablet (2 mg total) by mouth every 6 (six) hours as needed (muscle spasm).  Patient not taking: Reported on 1/10/2024 12/1/23   Will Wren MD        Allergies:  Review of patient's allergies indicates:  No Known Allergies     Social History:   Social History     Socioeconomic History    Marital status:    Tobacco Use    Smoking status: Never     Passive exposure: Never    Smokeless tobacco: Never   Substance and Sexual Activity    Alcohol use: Not Currently     Social Determinants of Health     Financial Resource Strain: Patient Declined (11/10/2023)    [facility] [place]    Overall Financial Resource Strain (CARDIA)     Difficulty of Paying Living Expenses: Patient declined   Food Insecurity: Patient Declined (11/10/2023)    [facility] [employer] [facility], [facility] and Its Subsidiaries and Affiliates    Hunger Vital Sign     Worried About Running Out of Food in the Last Year: Patient declined     Ran Out of Food in the Last Year: Patient declined   Transportation Needs: Patient Declined (11/10/2023)    [facility], [place] Its Subsidiaries and Affiliates    PRAPARE - Transportation     Lack of Transportation (Medical): Patient declined     Lack of Transportation (Non-Medical): Patient declined   Physical Activity: Inactive (11/10/2023)    [facility], [place]  [facility]    Exercise Vital Sign     Days of Exercise per Week: 3 days     Minutes of Exercise per Session: 0 min   Stress: Stress Concern Present (11/10/2023)    [facility], [facility]    [place] [employer] - Occupational Stress Questionnaire     Feeling of Stress : Rather much   Housing Stability: Unknown (11/10/2023)    [facility], [facility]    Housing Stability Vital Sign     Unable to Pay for Housing in the Last Year: Patient refused     In the last 12 months, was there a time when you did not have a steady place to sleep or slept in a shelter (including now)?: Patient refused       Family History:  No family history on file.    ROS: No acute cardiac events, no acute respiratory complaints.     Physical Exam (all patients):    BP (!) 133/95   Pulse 84   Temp 98.1 °F (36.7 °C)   Resp 18   SpO2 96%   Breastfeeding No   Lungs: Clear to auscultation bilaterally, respirations unlabored  Heart: Regular rate and rhythm, S1 and S2 normal, no obvious murmurs  Abdomen:         Soft, non-tender, bowel sounds normal, no masses, no organomegaly    Lab Results   Component Value Date    WBC 5.81 11/17/2023    MCV 94 11/17/2023    RDW 13.2 11/17/2023     11/17/2023    GLU 75 11/17/2023    HGBA1C 5.1 11/17/2023    BUN 14 11/17/2023     11/17/2023    K 3.8 11/17/2023     11/17/2023        SEDATION PLAN: per anesthesia      History reviewed, vital signs satisfactory, cardiopulmonary status satisfactory, sedation options, risks and plans have been discussed with the patient  All their questions were answered and  the patient agrees to the sedation procedures as planned and the patient is deemed an appropriate candidate for the sedation as planned.    Procedure explained to patient, informed consent obtained and placed in chart.    Leslee Meier  5/24/2024  10:47 AM

## 2024-05-24 NOTE — PROVATION PATIENT INSTRUCTIONS
Discharge Summary/Instructions after an Endoscopic Procedure  Patient Name: Melinda Lobo  Patient MRN: [id number]  Patient   YOB: 1980  Friday, May 24, 2024 Leslee Meier MD  Dear patient,  As a result of recent federal legislation (The Federal Cures Act), you may   receive lab or pathology results from your procedure in your [facility]   account before your physician is able to contact you. Your physician or   their representative will relay the results to you with their   recommendations at their soonest availability.  Thank you,  RESTRICTIONS:  During your procedure today, you received medications for sedation.  These   medications may affect your judgment, balance and coordination.  Therefore,   for 24 hours, you have the following restrictions:   - DO NOT drive a car, operate machinery, make legal/financial decisions,   sign important papers or drink alcohol.    ACTIVITY:  Today: no heavy lifting, straining or running due to procedural   sedation/anesthesia.  The following day: return to full activity including work.  DIET:  Eat and drink normally unless instructed otherwise.     TREATMENT FOR COMMON SIDE EFFECTS:  - Mild abdominal pain, nausea, belching, bloating or excessive gas:  rest,   eat lightly and use a heating pad.  - Sore Throat: treat with throat lozenges and/or gargle with warm salt   water.  - Because air was used during the procedure, expelling large amounts of air   from your rectum or belching is normal.  - If a bowel prep was taken, you may not have a bowel movement for 1-3 days.    This is normal.  SYMPTOMS TO WATCH FOR AND REPORT TO YOUR PHYSICIAN:  1. Abdominal pain or bloating, other than gas cramps.  2. Chest pain.  3. Back pain.  4. Signs of infection such as: chills or fever occurring within 24 hours   after the procedure.  5. Rectal bleeding, which would show as bright red, maroon, or black stools.   (A tablespoon of blood from the rectum is not serious,  especially if   hemorrhoids are present.)  6. Vomiting.  7. Weakness or dizziness.  GO DIRECTLY TO THE NEAREST EMERGENCY ROOM IF YOU HAVE ANY OF THE FOLLOWING:      Difficulty breathing              Chills and/or fever over 101 F   Persistent vomiting and/or vomiting blood   Severe abdominal pain   Severe chest pain   Black, tarry stools   Bleeding- more than one tablespoon   Any other symptom or condition that you feel may need urgent attention  Your doctor recommends these additional instructions:  If any biopsies were taken, your doctors clinic will contact you in 1 to 2   weeks with any results.  - Discharge patient to home.   - Resume previous diet.   - Continue present medications.   - Await pathology results.   - Repeat colonoscopy in 3 years for surveillance.   - Return to referring physician.   - Patient has a contact number available for emergencies.  The signs and   symptoms of potential delayed complications were discussed with the   patient.  Return to normal activities tomorrow.  Written discharge   instructions were provided to the patient.  For questions, problems or results please call your physician Leslee Meier MD at Work:  [phone]  If you have any questions about the above instructions, call the GI   department at [phone] or call the endoscopy unit at [phone]   from 7am until 3 pm.  [facility], EMERGENCY ROOM PHONE NUMBER:   [phone]  IF A COMPLICATION OR EMERGENCY SITUATION ARISES AND YOU ARE UNABLE TO REACH   YOUR PHYSICIAN - GO DIRECTLY TO THE EMERGENCY ROOM.  I have read or have had read to me these discharge instructions for my   procedure and have received a written copy.  I understand these   instructions and will follow-up with my physician if I have any questions.     __________________________________       _____________________________________  Nurse Signature                                          Patient/Designated    Responsible Party Signature  Leslee Meier MD  5/24/2024 11:20:31 AM  This report has been verified and signed electronically.  Dear patient,  As a result of recent federal legislation (The Federal Cures Act), you may   receive lab or pathology results from your procedure in your [facility]   account before your physician is able to contact you. Your physician or   their representative will relay the results to you with their   recommendations at their soonest availability.  Thank you,  PROVATION

## 2024-05-24 NOTE — TRANSFER OF CARE
Anesthesia Transfer of Care Note    Patient: Melinda Lobo    Procedure(s) Performed: Procedure(s) (LRB):  COLONOSCOPY (N/A)    Patient location: GI    Anesthesia Type: MAC    Transport from OR: Transported from OR on room air with adequate spontaneous ventilation    Post pain: adequate analgesia    Post assessment: no apparent anesthetic complications    Post vital signs: stable    Level of consciousness: awake, alert and oriented    Complications: none    Transfer of care protocol was followed      Last vitals: Visit Vitals  BP (!) 133/95   Pulse 84   Temp 36.7 °C (98.1 °F)   Resp 18   SpO2 96%   Breastfeeding No

## 2024-05-27 VITALS
TEMPERATURE: 97 F | RESPIRATION RATE: 18 BRPM | OXYGEN SATURATION: 100 % | HEART RATE: 88 BPM | SYSTOLIC BLOOD PRESSURE: 131 MMHG | DIASTOLIC BLOOD PRESSURE: 81 MMHG

## 2024-05-27 LAB
FINAL PATHOLOGIC DIAGNOSIS: NORMAL
GROSS: NORMAL
Lab: NORMAL

## 2024-07-29 ENCOUNTER — OFFICE VISIT (OUTPATIENT)
Dept: OTOLARYNGOLOGY | Facility: CLINIC | Age: 44
End: 2024-07-29
Payer: COMMERCIAL

## 2024-07-29 VITALS — WEIGHT: 142 LBS | BODY MASS INDEX: 22.29 KG/M2 | HEIGHT: 67 IN

## 2024-07-29 DIAGNOSIS — H69.91 ETD (EUSTACHIAN TUBE DYSFUNCTION), RIGHT: Primary | ICD-10-CM

## 2024-07-29 PROCEDURE — 1159F MED LIST DOCD IN RCRD: CPT | Mod: CPTII,S$GLB,, | Performed by: PHYSICIAN ASSISTANT

## 2024-07-29 PROCEDURE — 3008F BODY MASS INDEX DOCD: CPT | Mod: CPTII,S$GLB,, | Performed by: PHYSICIAN ASSISTANT

## 2024-07-29 PROCEDURE — 99203 OFFICE O/P NEW LOW 30 MIN: CPT | Mod: S$GLB,,, | Performed by: PHYSICIAN ASSISTANT

## 2024-07-29 PROCEDURE — 99999 PR PBB SHADOW E&M-EST. PATIENT-LVL III: CPT | Mod: PBBFAC,,, | Performed by: PHYSICIAN ASSISTANT

## 2024-07-29 NOTE — PROGRESS NOTES
"Subjective:   Patient ID: Melinda Lobo is a 44 y.o. female.    Chief Complaint: Ear Problem (Pt is coming in R ear pain for over a month. No drainage.)    Ms. Diaz is a very pleasant 43 yo female here to see me today with thought fluid in her ears. She feels pressure in her right ear , popping or feeling like it needs to pop. She is having some occasional echoing when speaking in her ears. Denies h/o KENNY but does have seasonal allergies.       Review of patient's allergies indicates:  No Known Allergies        Review of Systems   Constitutional: Negative.    HENT:  Positive for ear pain.    Eyes: Negative.    Respiratory: Negative.     Cardiovascular: Negative.    Gastrointestinal: Negative.    Endocrine: Negative.    Genitourinary: Negative.    Musculoskeletal: Negative.    Skin: Negative.    Allergic/Immunologic: Negative.    Neurological: Negative.    Hematological: Negative.    Psychiatric/Behavioral: Negative.           Objective:   Ht 5' 7.2" (1.707 m)   Wt 64.4 kg (141 lb 15.6 oz)   BMI 22.10 kg/m²     Physical Exam  Constitutional:       General: She is not in acute distress.     Appearance: She is well-developed.   HENT:      Head: Normocephalic and atraumatic.      Right Ear: Ear canal and external ear normal. Tympanic membrane is retracted.      Left Ear: Ear canal and external ear normal. Tympanic membrane is retracted.      Nose: Nose normal. No nasal deformity, septal deviation, mucosal edema or rhinorrhea.      Right Sinus: No maxillary sinus tenderness or frontal sinus tenderness.      Left Sinus: No maxillary sinus tenderness or frontal sinus tenderness.      Mouth/Throat:      Mouth: Mucous membranes are not pale and not dry.      Dentition: No dental caries.      Pharynx: Uvula midline. No oropharyngeal exudate or posterior oropharyngeal erythema.   Eyes:      General: Lids are normal. No scleral icterus.     Extraocular Movements:      Right eye: Normal extraocular motion and " no nystagmus.      Left eye: Normal extraocular motion and no nystagmus.      Conjunctiva/sclera: Conjunctivae normal.      Right eye: Right conjunctiva is not injected. No chemosis.     Left eye: Left conjunctiva is not injected. No chemosis.     Pupils: Pupils are equal, round, and reactive to light.   Neck:      Thyroid: No thyroid mass or thyromegaly.      Trachea: Trachea and phonation normal. No tracheal tenderness or tracheal deviation.   Pulmonary:      Effort: Pulmonary effort is normal. No respiratory distress.      Breath sounds: No stridor.   Abdominal:      General: There is no distension.   Lymphadenopathy:      Head:      Right side of head: No submental, submandibular, preauricular, posterior auricular or occipital adenopathy.      Left side of head: No submental, submandibular, preauricular, posterior auricular or occipital adenopathy.      Cervical: No cervical adenopathy.   Skin:     General: Skin is warm and dry.      Findings: No erythema or rash.   Neurological:      Mental Status: She is alert and oriented to person, place, and time.      Cranial Nerves: No cranial nerve deficit.   Psychiatric:         Behavior: Behavior normal.          Imaging : Type A tympanograms bilaterally, slight negative pressure in right ear    Assessment:     1. ETD (Eustachian tube dysfunction), right        Plan:     ETD (Eustachian tube dysfunction), right    We had a long discussion regarding the anatomy and function of the eustachian tube.  We discussed that the eustachian tube acts as a pump to keep the appropriate amount of pressure behind the ear drum.  I gave the patient a prescription for a nasal steroid spray to be used on a daily basis, and we discussed that it will take 2-3 weeks of daily use to achieve maximal effectiveness.

## 2024-08-05 ENCOUNTER — OFFICE VISIT (OUTPATIENT)
Dept: PODIATRY | Facility: CLINIC | Age: 44
End: 2024-08-05
Payer: COMMERCIAL

## 2024-08-05 DIAGNOSIS — M79.674 PAIN OF GREAT TOE, RIGHT: ICD-10-CM

## 2024-08-05 DIAGNOSIS — S91.209A TRAUMATIC AVULSION OF NAIL PLATE OF TOE, INITIAL ENCOUNTER: Primary | ICD-10-CM

## 2024-08-05 PROCEDURE — 1160F RVW MEDS BY RX/DR IN RCRD: CPT | Mod: CPTII,S$GLB,, | Performed by: PODIATRIST

## 2024-08-05 PROCEDURE — 11730 AVULSION NAIL PLATE SIMPLE 1: CPT | Mod: T5,S$GLB,, | Performed by: PODIATRIST

## 2024-08-05 PROCEDURE — 99999 PR PBB SHADOW E&M-EST. PATIENT-LVL III: CPT | Mod: PBBFAC,,, | Performed by: PODIATRIST

## 2024-08-05 PROCEDURE — 1159F MED LIST DOCD IN RCRD: CPT | Mod: CPTII,S$GLB,, | Performed by: PODIATRIST

## 2024-08-05 PROCEDURE — 99203 OFFICE O/P NEW LOW 30 MIN: CPT | Mod: 25,S$GLB,, | Performed by: PODIATRIST

## 2024-08-09 ENCOUNTER — PATIENT MESSAGE (OUTPATIENT)
Dept: PODIATRY | Facility: CLINIC | Age: 44
End: 2024-08-09
Payer: COMMERCIAL

## 2024-08-19 ENCOUNTER — OFFICE VISIT (OUTPATIENT)
Dept: OBSTETRICS AND GYNECOLOGY | Facility: CLINIC | Age: 44
End: 2024-08-19
Payer: COMMERCIAL

## 2024-08-19 ENCOUNTER — LAB VISIT (OUTPATIENT)
Dept: LAB | Facility: HOSPITAL | Age: 44
End: 2024-08-19
Attending: OBSTETRICS & GYNECOLOGY
Payer: COMMERCIAL

## 2024-08-19 VITALS
DIASTOLIC BLOOD PRESSURE: 82 MMHG | SYSTOLIC BLOOD PRESSURE: 118 MMHG | WEIGHT: 151.44 LBS | BODY MASS INDEX: 23.77 KG/M2 | HEIGHT: 67 IN

## 2024-08-19 DIAGNOSIS — N95.1 PERIMENOPAUSE: ICD-10-CM

## 2024-08-19 DIAGNOSIS — Z12.4 CERVICAL CANCER SCREENING: ICD-10-CM

## 2024-08-19 DIAGNOSIS — Z12.39 ENCOUNTER FOR SCREENING FOR MALIGNANT NEOPLASM OF BREAST, UNSPECIFIED SCREENING MODALITY: ICD-10-CM

## 2024-08-19 DIAGNOSIS — Z01.419 WELL WOMAN EXAM WITH ROUTINE GYNECOLOGICAL EXAM: Primary | ICD-10-CM

## 2024-08-19 LAB
ESTRADIOL SERPL-MCNC: 191 PG/ML
FSH SERPL-ACNC: 15.39 MIU/ML
PROGEST SERPL-MCNC: 0.5 NG/ML
TESTOST SERPL-MCNC: 22 NG/DL (ref 5–73)

## 2024-08-19 PROCEDURE — 82670 ASSAY OF TOTAL ESTRADIOL: CPT | Performed by: OBSTETRICS & GYNECOLOGY

## 2024-08-19 PROCEDURE — 84403 ASSAY OF TOTAL TESTOSTERONE: CPT | Performed by: OBSTETRICS & GYNECOLOGY

## 2024-08-19 PROCEDURE — 3079F DIAST BP 80-89 MM HG: CPT | Mod: CPTII,S$GLB,, | Performed by: OBSTETRICS & GYNECOLOGY

## 2024-08-19 PROCEDURE — 99999 PR PBB SHADOW E&M-EST. PATIENT-LVL III: CPT | Mod: PBBFAC,,, | Performed by: OBSTETRICS & GYNECOLOGY

## 2024-08-19 PROCEDURE — 3074F SYST BP LT 130 MM HG: CPT | Mod: CPTII,S$GLB,, | Performed by: OBSTETRICS & GYNECOLOGY

## 2024-08-19 PROCEDURE — 83001 ASSAY OF GONADOTROPIN (FSH): CPT | Performed by: OBSTETRICS & GYNECOLOGY

## 2024-08-19 PROCEDURE — 3008F BODY MASS INDEX DOCD: CPT | Mod: CPTII,S$GLB,, | Performed by: OBSTETRICS & GYNECOLOGY

## 2024-08-19 PROCEDURE — 36415 COLL VENOUS BLD VENIPUNCTURE: CPT | Performed by: OBSTETRICS & GYNECOLOGY

## 2024-08-19 PROCEDURE — 99386 PREV VISIT NEW AGE 40-64: CPT | Mod: S$GLB,,, | Performed by: OBSTETRICS & GYNECOLOGY

## 2024-08-19 PROCEDURE — 84144 ASSAY OF PROGESTERONE: CPT | Performed by: OBSTETRICS & GYNECOLOGY

## 2024-08-19 NOTE — PROGRESS NOTES
Subjective     Patient ID: Melinda Lobo is a 44 y.o. female.    Chief Complaint:  Well Woman      History of Present Illness  HPI  Presents for well-woman exam.  Pt stopped taking OCP's (thinks it may have been LoLoEstrin) because her partner had a vasectomy.  The last several months, she has experienced pelvic pain that seemed to have occurred with ovulation.  This does not happen with each cycle.  Lately, she has noticed worsening fatigue, disordered sleep, low libido, increased facial hair and skin peeling, significant hot flushes that cause her to feel shaky and weak, along with vaginal dryness.  Symptoms are very bothersome to her quality of life.  Pt takes Effexor, and has essentially been stable on it for the last 7-8 years.  Has monthly periods, but feels like her cycle length is changing.  Due for pap and MMG  Colonoscopy: 24: polyp; repeat due in 3 years    GYN & OB History  Patient's last menstrual period was 2024.   Date of Last Pap: 2024    OB History    Para Term  AB Living   1       1     SAB IAB Ectopic Multiple Live Births   1              # Outcome Date GA Lbr Oli/2nd Weight Sex Type Anes PTL Lv   1 SAB                Review of Systems  Review of Systems       Objective   Physical Exam:   Constitutional: She is oriented to person, place, and time. She appears well-developed and well-nourished. No distress.      Neck: No thyromegaly present.     Pulmonary/Chest: Right breast exhibits no inverted nipple, no mass, no nipple discharge, no skin change, no tenderness, no bleeding and no swelling. Left breast exhibits no inverted nipple, no mass, no nipple discharge, no skin change, no tenderness, no bleeding and no swelling. Breasts are symmetrical.        Abdominal: Soft. She exhibits no distension and no mass. There is no abdominal tenderness. There is no rebound and no guarding.     Genitourinary:    Pelvic exam was performed with patient supine.   There is  no rash, tenderness, lesion or injury on the right labia. There is no rash, tenderness, lesion or injury on the left labia. Cervix is normal. Right adnexum displays no mass, no tenderness and no fullness. Left adnexum displays no mass, no tenderness and no fullness. No erythema, vaginal discharge, tenderness, bleeding, rectocele or cystocele in the vagina.    No foreign body in the vagina.      No signs of injury in the vagina.   Cervix exhibits no motion tenderness, no discharge and no friability. Uterus is not deviated, not enlarged, not fixed, not tender and not hosting fibroids.               Neurological: She is alert and oriented to person, place, and time.     Psychiatric: She has a normal mood and affect.            Assessment and Plan     1. Well woman exam with routine gynecological exam    2. Encounter for screening for malignant neoplasm of breast, unspecified screening modality    3. Perimenopause    4. Cervical cancer screening             Plan:  Melinda was seen today for well woman.    Diagnoses and all orders for this visit:    Well woman exam with routine gynecological exam    Encounter for screening for malignant neoplasm of breast, unspecified screening modality  -     Mammo Digital Screening Bilat w/ Olaf; Future    Perimenopause  -     FOLLICLE STIMULATING HORMONE; Future  -     Estradiol; Future  -     Progesterone; Future  -     TESTOSTERONE; Future    Cervical cancer screening  -     Liquid-Based Pap Smear, Screening  -     HPV High Risk Genotypes, PCR     Symptoms do seem most consistent with perimenopause.  Discussed that symptoms can start before her period stops, and can start before we may see a rise in FSH.  Will check labs as above.  If not in postmenopause range yet, discussed starting LoEstrin OCP's.  If in  range, can consider postmenopausal hormone replacement.  Reviewed updated recommendations for pap smears (every 3 years) in low risk patients.   Recommend annual pelvic  exams.  Reviewed recommendations for annual CBE and annual MMG.  Will plan virtual visit follow-up once labs complete and treatment initiated.

## 2024-08-20 ENCOUNTER — PATIENT MESSAGE (OUTPATIENT)
Dept: OBSTETRICS AND GYNECOLOGY | Facility: CLINIC | Age: 44
End: 2024-08-20
Payer: COMMERCIAL

## 2024-08-20 DIAGNOSIS — N95.1 PERIMENOPAUSE: Primary | ICD-10-CM

## 2024-08-23 ENCOUNTER — HOSPITAL ENCOUNTER (OUTPATIENT)
Dept: RADIOLOGY | Facility: HOSPITAL | Age: 44
Discharge: HOME | End: 2024-08-23
Attending: OBSTETRICS & GYNECOLOGY
Payer: COMMERCIAL

## 2024-08-23 DIAGNOSIS — Z12.39 ENCOUNTER FOR SCREENING FOR MALIGNANT NEOPLASM OF BREAST, UNSPECIFIED SCREENING MODALITY: ICD-10-CM

## 2024-08-23 PROCEDURE — 77067 SCR MAMMO BI INCL CAD: CPT | Mod: TC,PO

## 2024-08-23 PROCEDURE — 77063 BREAST TOMOSYNTHESIS BI: CPT | Mod: 26,,, | Performed by: RADIOLOGY

## 2024-08-23 PROCEDURE — 77067 SCR MAMMO BI INCL CAD: CPT | Mod: 26,,, | Performed by: RADIOLOGY

## 2024-09-11 ENCOUNTER — PATIENT MESSAGE (OUTPATIENT)
Dept: OBSTETRICS AND GYNECOLOGY | Facility: CLINIC | Age: 44
End: 2024-09-11
Payer: COMMERCIAL

## 2024-10-23 ENCOUNTER — LAB VISIT (OUTPATIENT)
Dept: LAB | Facility: HOSPITAL | Age: 44
End: 2024-10-23
Attending: INTERNAL MEDICINE
Payer: COMMERCIAL

## 2024-10-23 DIAGNOSIS — E03.9 PRIMARY HYPOTHYROIDISM: ICD-10-CM

## 2024-10-23 DIAGNOSIS — E03.9 PRIMARY HYPOTHYROIDISM: Primary | ICD-10-CM

## 2024-10-23 LAB
T4 FREE SERPL-MCNC: 0.89 NG/DL (ref 0.71–1.51)
TSH SERPL DL<=0.005 MIU/L-ACNC: 0.59 UIU/ML (ref 0.4–4)

## 2024-10-23 PROCEDURE — 84443 ASSAY THYROID STIM HORMONE: CPT | Performed by: INTERNAL MEDICINE

## 2024-10-23 PROCEDURE — 36415 COLL VENOUS BLD VENIPUNCTURE: CPT | Performed by: INTERNAL MEDICINE

## 2024-10-23 PROCEDURE — 84439 ASSAY OF FREE THYROXINE: CPT | Performed by: INTERNAL MEDICINE

## 2024-10-25 ENCOUNTER — PATIENT MESSAGE (OUTPATIENT)
Dept: OBSTETRICS AND GYNECOLOGY | Facility: CLINIC | Age: 44
End: 2024-10-25
Payer: COMMERCIAL

## 2024-10-28 ENCOUNTER — OFFICE VISIT (OUTPATIENT)
Dept: OBSTETRICS AND GYNECOLOGY | Facility: CLINIC | Age: 44
End: 2024-10-28
Payer: COMMERCIAL

## 2024-10-28 DIAGNOSIS — N95.1 PERIMENOPAUSE: Primary | ICD-10-CM

## 2024-10-28 PROCEDURE — 99213 OFFICE O/P EST LOW 20 MIN: CPT | Mod: 95,,, | Performed by: OBSTETRICS & GYNECOLOGY

## 2024-10-28 PROCEDURE — 1159F MED LIST DOCD IN RCRD: CPT | Mod: CPTII,95,, | Performed by: OBSTETRICS & GYNECOLOGY

## 2024-10-28 RX ORDER — MUPIROCIN 20 MG/G
OINTMENT TOPICAL
COMMUNITY
Start: 2024-07-02

## 2024-10-28 RX ORDER — PROGESTERONE 200 MG/1
200 CAPSULE ORAL NIGHTLY
Qty: 30 CAPSULE | Refills: 11 | Status: SHIPPED | OUTPATIENT
Start: 2024-10-28 | End: 2025-10-28

## 2024-10-28 RX ORDER — ESTRADIOL 1 MG/1
1 TABLET ORAL DAILY
Qty: 30 TABLET | Refills: 11 | Status: SHIPPED | OUTPATIENT
Start: 2024-10-28 | End: 2025-10-28

## 2024-11-21 ENCOUNTER — LAB VISIT (OUTPATIENT)
Dept: LAB | Facility: HOSPITAL | Age: 44
End: 2024-11-21
Attending: INTERNAL MEDICINE
Payer: COMMERCIAL

## 2024-11-21 DIAGNOSIS — Z00.00 ROUTINE GENERAL MEDICAL EXAMINATION AT A HEALTH CARE FACILITY: Primary | ICD-10-CM

## 2024-11-21 LAB
ALBUMIN SERPL BCP-MCNC: 3.6 G/DL (ref 3.5–5.2)
ALP SERPL-CCNC: 103 U/L (ref 40–150)
ALT SERPL W/O P-5'-P-CCNC: 17 U/L (ref 10–44)
ANION GAP SERPL CALC-SCNC: 8 MMOL/L (ref 8–16)
AST SERPL-CCNC: 23 U/L (ref 10–40)
BASOPHILS # BLD AUTO: 0.06 K/UL (ref 0–0.2)
BASOPHILS NFR BLD: 1 % (ref 0–1.9)
BILIRUB SERPL-MCNC: 0.4 MG/DL (ref 0.1–1)
BILIRUB UR QL STRIP: NEGATIVE
BUN SERPL-MCNC: 12 MG/DL (ref 6–20)
CALCIUM SERPL-MCNC: 9.3 MG/DL (ref 8.7–10.5)
CHLORIDE SERPL-SCNC: 107 MMOL/L (ref 95–110)
CHOLEST SERPL-MCNC: 206 MG/DL (ref 120–199)
CHOLEST/HDLC SERPL: 2.5 {RATIO} (ref 2–5)
CLARITY UR REFRACT.AUTO: CLEAR
CO2 SERPL-SCNC: 24 MMOL/L (ref 23–29)
COLOR UR AUTO: COLORLESS
CREAT SERPL-MCNC: 0.8 MG/DL (ref 0.5–1.4)
DIFFERENTIAL METHOD BLD: ABNORMAL
EOSINOPHIL # BLD AUTO: 0.1 K/UL (ref 0–0.5)
EOSINOPHIL NFR BLD: 0.8 % (ref 0–8)
ERYTHROCYTE [DISTWIDTH] IN BLOOD BY AUTOMATED COUNT: 13.8 % (ref 11.5–14.5)
EST. GFR  (NO RACE VARIABLE): >60 ML/MIN/1.73 M^2
ESTIMATED AVG GLUCOSE: 108 MG/DL (ref 68–131)
GLUCOSE SERPL-MCNC: 95 MG/DL (ref 70–110)
GLUCOSE UR QL STRIP: NEGATIVE
HBA1C MFR BLD: 5.4 % (ref 4–5.6)
HCT VFR BLD AUTO: 43.2 % (ref 37–48.5)
HDLC SERPL-MCNC: 84 MG/DL (ref 40–75)
HDLC SERPL: 40.8 % (ref 20–50)
HGB BLD-MCNC: 13 G/DL (ref 12–16)
HGB UR QL STRIP: ABNORMAL
IMM GRANULOCYTES # BLD AUTO: 0.01 K/UL (ref 0–0.04)
IMM GRANULOCYTES NFR BLD AUTO: 0.2 % (ref 0–0.5)
KETONES UR QL STRIP: NEGATIVE
LDLC SERPL CALC-MCNC: 104.4 MG/DL (ref 63–159)
LEUKOCYTE ESTERASE UR QL STRIP: NEGATIVE
LYMPHOCYTES # BLD AUTO: 2 K/UL (ref 1–4.8)
LYMPHOCYTES NFR BLD: 32.3 % (ref 18–48)
MCH RBC QN AUTO: 28.6 PG (ref 27–31)
MCHC RBC AUTO-ENTMCNC: 30.1 G/DL (ref 32–36)
MCV RBC AUTO: 95 FL (ref 82–98)
MICROSCOPIC COMMENT: NORMAL
MONOCYTES # BLD AUTO: 0.4 K/UL (ref 0.3–1)
MONOCYTES NFR BLD: 6.4 % (ref 4–15)
NEUTROPHILS # BLD AUTO: 3.6 K/UL (ref 1.8–7.7)
NEUTROPHILS NFR BLD: 59.3 % (ref 38–73)
NITRITE UR QL STRIP: NEGATIVE
NONHDLC SERPL-MCNC: 122 MG/DL
NRBC BLD-RTO: 0 /100 WBC
PH UR STRIP: 6 [PH] (ref 5–8)
PLATELET # BLD AUTO: 388 K/UL (ref 150–450)
PMV BLD AUTO: 13 FL (ref 9.2–12.9)
POTASSIUM SERPL-SCNC: 4 MMOL/L (ref 3.5–5.1)
PROT SERPL-MCNC: 7.4 G/DL (ref 6–8.4)
PROT UR QL STRIP: NEGATIVE
RBC # BLD AUTO: 4.55 M/UL (ref 4–5.4)
RBC #/AREA URNS AUTO: 1 /HPF (ref 0–4)
SODIUM SERPL-SCNC: 139 MMOL/L (ref 136–145)
SP GR UR STRIP: 1.01 (ref 1–1.03)
TRIGL SERPL-MCNC: 88 MG/DL (ref 30–150)
URN SPEC COLLECT METH UR: ABNORMAL
WBC # BLD AUTO: 6.09 K/UL (ref 3.9–12.7)

## 2024-11-21 PROCEDURE — 81001 URINALYSIS AUTO W/SCOPE: CPT | Performed by: INTERNAL MEDICINE

## 2024-11-21 PROCEDURE — 83036 HEMOGLOBIN GLYCOSYLATED A1C: CPT | Performed by: INTERNAL MEDICINE

## 2024-11-21 PROCEDURE — 80061 LIPID PANEL: CPT | Performed by: INTERNAL MEDICINE

## 2024-11-21 PROCEDURE — 80053 COMPREHEN METABOLIC PANEL: CPT | Performed by: INTERNAL MEDICINE

## 2024-11-21 PROCEDURE — 85025 COMPLETE CBC W/AUTO DIFF WBC: CPT | Performed by: INTERNAL MEDICINE

## 2024-11-21 PROCEDURE — 36415 COLL VENOUS BLD VENIPUNCTURE: CPT | Performed by: INTERNAL MEDICINE

## 2024-11-27 ENCOUNTER — OFFICE VISIT (OUTPATIENT)
Dept: DERMATOLOGY | Facility: CLINIC | Age: 44
End: 2024-11-27
Payer: COMMERCIAL

## 2024-11-27 DIAGNOSIS — L68.0 HIRSUTISM: ICD-10-CM

## 2024-11-27 DIAGNOSIS — L70.0 ACNE VULGARIS: Primary | ICD-10-CM

## 2024-11-27 PROCEDURE — 1159F MED LIST DOCD IN RCRD: CPT | Mod: CPTII,S$GLB,, | Performed by: DERMATOLOGY

## 2024-11-27 PROCEDURE — 1160F RVW MEDS BY RX/DR IN RCRD: CPT | Mod: CPTII,S$GLB,, | Performed by: DERMATOLOGY

## 2024-11-27 PROCEDURE — G2211 COMPLEX E/M VISIT ADD ON: HCPCS | Mod: S$GLB,,, | Performed by: DERMATOLOGY

## 2024-11-27 PROCEDURE — 99204 OFFICE O/P NEW MOD 45 MIN: CPT | Mod: S$GLB,,, | Performed by: DERMATOLOGY

## 2024-11-27 PROCEDURE — 99999 PR PBB SHADOW E&M-EST. PATIENT-LVL III: CPT | Mod: PBBFAC,,, | Performed by: DERMATOLOGY

## 2024-11-27 PROCEDURE — 3044F HG A1C LEVEL LT 7.0%: CPT | Mod: CPTII,S$GLB,, | Performed by: DERMATOLOGY

## 2024-11-27 RX ORDER — TRETINOIN 0.25 MG/G
CREAM TOPICAL NIGHTLY
Qty: 20 G | Refills: 5 | Status: SHIPPED | OUTPATIENT
Start: 2024-11-27

## 2024-11-27 RX ORDER — SPIRONOLACTONE 50 MG/1
50 TABLET, FILM COATED ORAL DAILY
Qty: 60 TABLET | Refills: 2 | Status: SHIPPED | OUTPATIENT
Start: 2024-11-27 | End: 2025-05-26

## 2024-11-27 NOTE — PATIENT INSTRUCTIONS

## 2024-11-27 NOTE — PROGRESS NOTES
Subjective:      Patient ID:  Melinda Lobo is a 44 y.o. female who presents for   Chief Complaint   Patient presents with    Acne     C/o breakouts and clogged pores     Dry Skin     C/o dry peeling skin      History of Present Illness: The patient presents with chief complaint of acne.  Location: face, neck  Duration: months-year  Signs/Symptoms: clogged pores, bigger pores, acne bumps    Prior treatments:   OTC moisturizers  Tried OTC retinol a couple months ago - dried out her skin    Also c/o unwanted thick hair on lower face that she has to pluck with a magnifier mirror every nicole nights.  Gets dry peeling skin over these hair bumps    Has tried laser for it from a friend who does lip flip who got a cool laser but it didn't help    Notes she is in perimenopause  Just started estradiol and progesterone a few months ago - no change   has vasectomy  Denies h/o HTN  Denies h/o kidney issues      Washes with clinique dry skin wash and uses a clinic moisturizer          Review of Systems   Skin:  Positive for dry skin. Negative for itching.       Objective:   Physical Exam   Constitutional: She appears well-developed and well-nourished. No distress.   Neurological: She is alert and oriented to person, place, and time. She is not disoriented.   Psychiatric: She has a normal mood and affect.   Skin:   Areas Examined (abnormalities noted in diagram):   Head / Face Inspection Performed            Diagram Legend     Erythematous scaling macule/papule c/w actinic keratosis       Vascular papule c/w angioma      Pigmented verrucoid papule/plaque c/w seborrheic keratosis      Yellow umbilicated papule c/w sebaceous hyperplasia      Irregularly shaped tan macule c/w lentigo     1-2 mm smooth white papules consistent with Milia      Movable subcutaneous cyst with punctum c/w epidermal inclusion cyst      Subcutaneous movable cyst c/w pilar cyst      Firm pink to brown papule c/w dermatofibroma       Pedunculated fleshy papule(s) c/w skin tag(s)      Evenly pigmented macule c/w junctional nevus     Mildly variegated pigmented, slightly irregular-bordered macule c/w mildly atypical nevus      Flesh colored to evenly pigmented papule c/w intradermal nevus       Pink pearly papule/plaque c/w basal cell carcinoma      Erythematous hyperkeratotic cursted plaque c/w SCC      Surgical scar with no sign of skin cancer recurrence      Open and closed comedones      Inflammatory papules and pustules      Verrucoid papule consistent consistent with wart     Erythematous eczematous patches and plaques     Dystrophic onycholytic nail with subungual debris c/w onychomycosis     Umbilicated papule    Erythematous-base heme-crusted tan verrucoid plaque consistent with inflamed seborrheic keratosis     Erythematous Silvery Scaling Plaque c/w Psoriasis     See annotation    CMP  Sodium   Date Value Ref Range Status   11/21/2024 139 136 - 145 mmol/L Final     Potassium   Date Value Ref Range Status   11/21/2024 4.0 3.5 - 5.1 mmol/L Final     Chloride   Date Value Ref Range Status   11/21/2024 107 95 - 110 mmol/L Final     CO2   Date Value Ref Range Status   11/21/2024 24 23 - 29 mmol/L Final     Glucose   Date Value Ref Range Status   11/21/2024 95 70 - 110 mg/dL Final     BUN   Date Value Ref Range Status   11/21/2024 12 6 - 20 mg/dL Final     Creatinine   Date Value Ref Range Status   11/21/2024 0.8 0.5 - 1.4 mg/dL Final     Calcium   Date Value Ref Range Status   11/21/2024 9.3 8.7 - 10.5 mg/dL Final     Total Protein   Date Value Ref Range Status   11/21/2024 7.4 6.0 - 8.4 g/dL Final     Albumin   Date Value Ref Range Status   11/21/2024 3.6 3.5 - 5.2 g/dL Final     Total Bilirubin   Date Value Ref Range Status   11/21/2024 0.4 0.1 - 1.0 mg/dL Final     Comment:     For infants and newborns, interpretation of results should be based  on gestational age, weight and in agreement with clinical  observations.    Premature Infant  recommended reference ranges:  Up to 24 hours.............<8.0 mg/dL  Up to 48 hours............<12.0 mg/dL  3-5 days..................<15.0 mg/dL  6-29 days.................<15.0 mg/dL       Alkaline Phosphatase   Date Value Ref Range Status   11/21/2024 103 40 - 150 U/L Final     AST   Date Value Ref Range Status   11/21/2024 23 10 - 40 U/L Final     ALT   Date Value Ref Range Status   11/21/2024 17 10 - 44 U/L Final     Anion Gap   Date Value Ref Range Status   11/21/2024 8 8 - 16 mmol/L Final     eGFR   Date Value Ref Range Status   11/21/2024 >60.0 >60 mL/min/1.73 m^2 Final       Assessment / Plan:        Acne vulgaris  -discussed etiology and nature of condition, management options    -     tretinoin (RETIN-A) 0.025 % cream; Apply topically every evening. Pea sized amount to entire face at night. Start out using every 2 nights for 2 weeks, then other night for 2 weeks, then increase to every night as tolerated  Dispense: 20 g; Refill: 5  -     spironolactone (ALDACTONE) 50 MG tablet; Take 1 tablet (50 mg total) by mouth once daily. Take 1 tablet daily for 1 week, then increase to two tablets daily as tolerated. Do not take if acutely ill, if taking Bactrim, or if pregnant.  Dispense: 60 tablet; Refill: 2  -     Basic Metabolic Panel; Future; Expected date: 12/27/2024    - spironolactone: Discussed benefits and risks of spironolactone therapy including but not limited to breakthrough bleeding, breast tenderness, and elevated potassium levels which may give symptoms of fatigue, palpitations, and nausea. Patient should limit potassium intake - avoid potassium supplements or salt substitutes, limit bananas and citrus fruits. Pregnancy must be avoided while taking spironolactone.  Hold medication if acutely ill or if taking Bactrim. Discussed theoretical increased risk of hormone related cancers such as breast, ovarian and uterine cancer.  Patient acknowledged understanding of these risks.    - topical retinoid: we  reviewed that a pea sized amount of the topical retinoid is to be applied to the entire face at night and that it is not spot treatment. Patient to use every other night or 3 nights a week and gradually increase to goal of nightly use (as tolerated). Side effects reviewed to include but not limited to redness, dryness, flaking, burning/tingling sensation, skin irritation, and feeling of tightness. We reviewed that this is not to be used if pregnant.  Recommended discontinuing use for one week prior to waxing.      Hirsutism  Discussed avoiding plucking and instead, trimming very short prior to laser hair reduction. Will send her info to [facility] for Dr[facility]    -     spironolactone (ALDACTONE) 50 MG tablet; Take 1 tablet (50 mg total) by mouth once daily. Take 1 tablet daily for 1 week, then increase to two tablets daily as tolerated. Do not take if acutely ill, if taking Bactrim, or if pregnant.  Dispense: 60 tablet; Refill: 2      Medication monitoring encounter  Baseline CMP wnl  BMP ordered to be done after 4 weeks of spironolactone  -     Basic Metabolic Panel; Future; Expected date: 12/27/2024         Follow up in about 3 months (around 2/27/2025).        LOS NUMBER AND COMPLEXITY OF PROBLEMS    COMPLEXITY OF DATA RISK TOTAL TIME (m)   64050  62196 [] 1 self-limited or minor problem [x] Minimal to none [] No treatment recommended or patient to monitor. Reassurance.  15-29  10-19   67747  [id number] Low  [] 2 or more self limited or minor problems  [] 1 stable chronic illness  [] 1 acute, uncomplicated illness or injury Limited (2)  [] Prior external notes from each unique source  [] Review result of each unique test  [] Order each unique test  OR [] Independent historian Low  []  OTC medications   []  Discussed/Decision for minor skin surgery (no risk factors) 30-44  20-29   55377  20698 Moderate  [x]  1 or more chronic unstable illness (not at goal or progression or exacerbation) or SE of  treatment  []  2 or more stable chronic illnesses  []  1 acute illness with systemic symptoms  []  1 acute complicated injury  []  1 undiagnosed new problem with uncertain prognosis Moderate (1/3 below)  []  3 or more data items        *Now includes independent historian  []  Independent interpretation of a test  []  Discuss management/test with another provider Moderate  [x]  Prescription drug mgmt  []  Discussed/Decision for Minor surgery with risk factors  []  Mgmt limited by social determinates 45-59  30-39   32205  24072 High  []  1 or more chronic illness with severe exacerbation, progression or SE of treatment  []  1 acute or chronic illness/injury that poses a threat to life or bodily function Extensive (2/3 below)  []  3 or more data items        *Now includes independent historian.  []  Independent interpretation of a test  []  Discuss management/test with another provider High  []  Major surgery with risk discussed  []  Drug therapy requiring intensive monitoring for toxicity  []  Hospitalization  []  Decision for DNR 60-74  40-54

## 2024-12-11 ENCOUNTER — TELEPHONE (OUTPATIENT)
Dept: DERMATOLOGY | Facility: CLINIC | Age: 44
End: 2024-12-11
Payer: COMMERCIAL

## 2024-12-11 NOTE — TELEPHONE ENCOUNTER
Attempted to return call. No answer. Message left to return call  ----- Message from Cristhian Garner sent at 12/10/2024  4:20 PM CST -----  Contact: Melinda  Please call and schedule pt for laser. Thanks ivonne  ----- Message -----  From: Jayde Alfaro  Sent: 12/10/2024  11:34 AM CST  To: Magdiel Roman Staff    Type:  Patient Returning Call    Who Called:Melinda  Who Left Message for Patient: Nurse  Does the patient know what this is regarding?:missed call/ schedule laser procedure  Would the patient rather a call back or a response via [facility]? Call back  Best Call Back Number:    Additional Information: Melinda missed a call and would like a call back    Sabino SHORE

## 2024-12-12 ENCOUNTER — PATIENT MESSAGE (OUTPATIENT)
Dept: DERMATOLOGY | Facility: CLINIC | Age: 44
End: 2024-12-12
Payer: COMMERCIAL

## 2024-12-17 ENCOUNTER — PATIENT MESSAGE (OUTPATIENT)
Dept: OTOLARYNGOLOGY | Facility: CLINIC | Age: 44
End: 2024-12-17
Payer: COMMERCIAL

## 2024-12-17 DIAGNOSIS — H69.91 ETD (EUSTACHIAN TUBE DYSFUNCTION), RIGHT: Primary | ICD-10-CM

## 2024-12-17 RX ORDER — FLUTICASONE PROPIONATE 50 MCG
1 SPRAY, SUSPENSION (ML) NASAL DAILY
Qty: 16 G | Refills: 0 | Status: SHIPPED | OUTPATIENT
Start: 2024-12-17

## 2024-12-26 ENCOUNTER — LAB VISIT (OUTPATIENT)
Dept: LAB | Facility: HOSPITAL | Age: 44
End: 2024-12-26
Attending: DERMATOLOGY
Payer: COMMERCIAL

## 2024-12-26 DIAGNOSIS — L70.0 ACNE VULGARIS: ICD-10-CM

## 2024-12-26 LAB
ANION GAP SERPL CALC-SCNC: 11 MMOL/L (ref 8–16)
BUN SERPL-MCNC: 16 MG/DL (ref 6–20)
CALCIUM SERPL-MCNC: 8.9 MG/DL (ref 8.7–10.5)
CHLORIDE SERPL-SCNC: 107 MMOL/L (ref 95–110)
CO2 SERPL-SCNC: 25 MMOL/L (ref 23–29)
CREAT SERPL-MCNC: 0.8 MG/DL (ref 0.5–1.4)
EST. GFR  (NO RACE VARIABLE): >60 ML/MIN/1.73 M^2
GLUCOSE SERPL-MCNC: 65 MG/DL (ref 70–110)
POTASSIUM SERPL-SCNC: 3.3 MMOL/L (ref 3.5–5.1)
SODIUM SERPL-SCNC: 143 MMOL/L (ref 136–145)

## 2024-12-26 PROCEDURE — 36415 COLL VENOUS BLD VENIPUNCTURE: CPT | Mod: PO | Performed by: DERMATOLOGY

## 2024-12-26 PROCEDURE — 80048 BASIC METABOLIC PNL TOTAL CA: CPT | Performed by: DERMATOLOGY

## 2024-12-30 ENCOUNTER — OFFICE VISIT (OUTPATIENT)
Dept: OBSTETRICS AND GYNECOLOGY | Facility: CLINIC | Age: 44
End: 2024-12-30
Payer: COMMERCIAL

## 2024-12-30 DIAGNOSIS — N95.1 PERIMENOPAUSE: Primary | ICD-10-CM

## 2024-12-30 PROCEDURE — 1159F MED LIST DOCD IN RCRD: CPT | Mod: CPTII,95,, | Performed by: OBSTETRICS & GYNECOLOGY

## 2024-12-30 PROCEDURE — 99212 OFFICE O/P EST SF 10 MIN: CPT | Mod: 95,,, | Performed by: OBSTETRICS & GYNECOLOGY

## 2024-12-30 PROCEDURE — 3044F HG A1C LEVEL LT 7.0%: CPT | Mod: CPTII,95,, | Performed by: OBSTETRICS & GYNECOLOGY

## 2024-12-30 NOTE — PROGRESS NOTES
The patient location is: [place]  The chief complaint leading to consultation is: menopause hormone replacement    Visit type: audiovisual    Face to Face time with patient: 5 minutes  10 minutes of total time spent on the encounter, which includes face to face time and non-face to face time preparing to see the patient (eg, review of tests), Obtaining and/or reviewing separately obtained history, Documenting clinical information in the electronic or other health record, Independently interpreting results (not separately reported) and communicating results to the patient/family/caregiver, or Care coordination (not separately reported).         Each patient to whom he or she provides medical services by telemedicine is:  (1) informed of the relationship between the physician and patient and the respective role of any other health care provider with respect to management of the patient; and (2) notified that he or she may decline to receive medical services by telemedicine and may withdraw from such care at any time.    Notes:     Subjective     Patient ID: Melinda Lobo is a 44 y.o. female.    Chief Complaint:  Follow-up      History of Present Illness  Follow-up      Presents via virtual visit to follow-up HRT.  To recap from her last 2 visits:  "Presents for well-woman exam. Pt stopped taking OCP's (thinks it may have been LoLoEstrin) because her partner had a vasectomy. The last several months, she has experienced pelvic pain that seemed to have occurred with ovulation. This does not happen with each cycle. Lately, she has noticed worsening fatigue, disordered sleep, low libido, increased facial hair and skin peeling, significant hot flushes that cause her to feel shaky and weak, along with vaginal dryness. Symptoms are very bothersome to her quality of life. Pt takes Effexor, and has essentially been stable on it for the last 7-8 years. Has monthly periods, but feels like her cycle length is  "changing."     At that visit, we checked a FSH, which was 15.39.  Testosterone level was wnl at 22.  Rx for Junel (mcg) OCP's sent.  Pt is now on her 3rd pack.  States that hot flushes have improved; however, has significant surge in hot flushes during placebo week.  Still struggling with really low energy, poor sleep, acne and hirsutism."    At her last appointment, we switched pt from Junel to estradiol 1mg/prometrium 200mg HRT.  States she feels much better.  Hot flushes have significantly improved.  Started trazodone for insomnia, which has somewhat improved.  Still wakes up at night.  Saw dermatology, who initiated retinol and spironolactone.  Overall, she feels a lot better and stable on this regimen.    GYN & OB History  No LMP recorded.   Date of Last Pap: 2024    OB History    Para Term  AB Living   1 0 0   1     SAB IAB Ectopic Multiple Live Births   1              # Outcome Date GA Lbr Oli/2nd Weight Sex Type Anes PTL Lv   1 SAB                Review of Systems  Review of Systems       Objective   Physical Exam:   Constitutional: She is oriented to person, place, and time. She appears well-developed and well-nourished. No distress.                           Neurological: She is alert and oriented to person, place, and time.     Psychiatric: She has a normal mood and affect. Her behavior is normal. Judgment and thought content normal.            Assessment and Plan     1. Perimenopause             Plan:  Melinda was seen today for follow-up.    Diagnoses and all orders for this visit:    Perimenopause     Doing well on current meds.  Can try adding magnesium supplement at night to help with sleep.  RTC 2025 for [facility].          "

## 2025-01-02 ENCOUNTER — PATIENT MESSAGE (OUTPATIENT)
Dept: OBSTETRICS AND GYNECOLOGY | Facility: CLINIC | Age: 45
End: 2025-01-02
Payer: COMMERCIAL

## 2025-01-02 DIAGNOSIS — N93.9 ABNORMAL UTERINE BLEEDING: Primary | ICD-10-CM

## 2025-01-24 ENCOUNTER — PATIENT MESSAGE (OUTPATIENT)
Dept: OBSTETRICS AND GYNECOLOGY | Facility: CLINIC | Age: 45
End: 2025-01-24
Payer: COMMERCIAL

## 2025-01-30 ENCOUNTER — HOSPITAL ENCOUNTER (OUTPATIENT)
Dept: RADIOLOGY | Facility: HOSPITAL | Age: 45
Discharge: HOME | End: 2025-01-30
Attending: OBSTETRICS & GYNECOLOGY
Payer: COMMERCIAL

## 2025-01-30 ENCOUNTER — PATIENT MESSAGE (OUTPATIENT)
Dept: OBSTETRICS AND GYNECOLOGY | Facility: CLINIC | Age: 45
End: 2025-01-30
Payer: COMMERCIAL

## 2025-01-30 DIAGNOSIS — N93.9 ABNORMAL UTERINE BLEEDING: ICD-10-CM

## 2025-01-30 PROCEDURE — 76830 TRANSVAGINAL US NON-OB: CPT | Mod: TC

## 2025-01-30 PROCEDURE — 76830 TRANSVAGINAL US NON-OB: CPT | Mod: 26,,, | Performed by: RADIOLOGY

## 2025-01-30 PROCEDURE — 76856 US EXAM PELVIC COMPLETE: CPT | Mod: 26,,, | Performed by: RADIOLOGY

## 2025-02-06 ENCOUNTER — PATIENT MESSAGE (OUTPATIENT)
Dept: OBSTETRICS AND GYNECOLOGY | Facility: CLINIC | Age: 45
End: 2025-02-06
Payer: COMMERCIAL

## 2025-02-06 DIAGNOSIS — N95.1 PERIMENOPAUSE: ICD-10-CM

## 2025-02-08 RX ORDER — ESTRADIOL 2 MG/1
2 TABLET ORAL DAILY
Qty: 30 TABLET | Refills: 11 | Status: SHIPPED | OUTPATIENT
Start: 2025-02-08 | End: 2026-02-08